# Patient Record
Sex: MALE | Race: WHITE | Employment: FULL TIME | URBAN - METROPOLITAN AREA
[De-identification: names, ages, dates, MRNs, and addresses within clinical notes are randomized per-mention and may not be internally consistent; named-entity substitution may affect disease eponyms.]

---

## 2017-06-22 RX ORDER — SODIUM CHLORIDE 9 MG/ML
100 INJECTION, SOLUTION INTRAVENOUS CONTINUOUS
Status: CANCELLED | OUTPATIENT
Start: 2017-06-22 | End: 2017-06-22

## 2017-06-22 RX ORDER — DEXTROMETHORPHAN/PSEUDOEPHED 2.5-7.5/.8
1.2 DROPS ORAL
Status: CANCELLED | OUTPATIENT
Start: 2017-06-22

## 2017-06-22 RX ORDER — EPINEPHRINE 0.1 MG/ML
1 INJECTION INTRACARDIAC; INTRAVENOUS
Status: CANCELLED | OUTPATIENT
Start: 2017-06-22 | End: 2017-06-22

## 2017-06-22 RX ORDER — ATROPINE SULFATE 0.1 MG/ML
0.5 INJECTION INTRAVENOUS
Status: CANCELLED | OUTPATIENT
Start: 2017-06-22 | End: 2017-06-22

## 2017-06-23 ENCOUNTER — HOSPITAL ENCOUNTER (OUTPATIENT)
Age: 62
Setting detail: OUTPATIENT SURGERY
Discharge: HOME | End: 2017-06-23
Attending: INTERNAL MEDICINE | Admitting: INTERNAL MEDICINE
Payer: COMMERCIAL

## 2017-06-23 VITALS
TEMPERATURE: 97.7 F | HEART RATE: 75 BPM | BODY MASS INDEX: 46.28 KG/M2 | SYSTOLIC BLOOD PRESSURE: 139 MMHG | RESPIRATION RATE: 16 BRPM | WEIGHT: 288 LBS | DIASTOLIC BLOOD PRESSURE: 76 MMHG | OXYGEN SATURATION: 96 % | HEIGHT: 66 IN

## 2017-06-23 LAB — GLUCOSE BLD STRIP.AUTO-MCNC: 96 MG/DL (ref 70–110)

## 2017-06-23 PROCEDURE — 76040000007: Performed by: INTERNAL MEDICINE

## 2017-06-23 PROCEDURE — 82962 GLUCOSE BLOOD TEST: CPT

## 2017-06-23 PROCEDURE — 88305 TISSUE EXAM BY PATHOLOGIST: CPT | Performed by: INTERNAL MEDICINE

## 2017-06-23 PROCEDURE — 77030020256 HC SOL INJ NACL 0.9%  500ML: Performed by: INTERNAL MEDICINE

## 2017-06-23 PROCEDURE — 99152 MOD SED SAME PHYS/QHP 5/>YRS: CPT

## 2017-06-23 PROCEDURE — 74011250636 HC RX REV CODE- 250/636

## 2017-06-23 PROCEDURE — 99153 MOD SED SAME PHYS/QHP EA: CPT

## 2017-06-23 PROCEDURE — 77030013991 HC SNR POLYP ENDOSC BSC -A: Performed by: INTERNAL MEDICINE

## 2017-06-23 RX ORDER — MIDAZOLAM HYDROCHLORIDE 1 MG/ML
.5-5 INJECTION, SOLUTION INTRAMUSCULAR; INTRAVENOUS
Status: DISCONTINUED | OUTPATIENT
Start: 2017-06-23 | End: 2017-06-23 | Stop reason: HOSPADM

## 2017-06-23 RX ORDER — NALOXONE HYDROCHLORIDE 0.4 MG/ML
0.4 INJECTION, SOLUTION INTRAMUSCULAR; INTRAVENOUS; SUBCUTANEOUS
Status: DISCONTINUED | OUTPATIENT
Start: 2017-06-23 | End: 2017-06-23 | Stop reason: HOSPADM

## 2017-06-23 RX ORDER — FLUMAZENIL 0.1 MG/ML
0.2 INJECTION INTRAVENOUS
Status: DISCONTINUED | OUTPATIENT
Start: 2017-06-23 | End: 2017-06-23 | Stop reason: HOSPADM

## 2017-06-23 RX ORDER — FENTANYL CITRATE 50 UG/ML
100 INJECTION, SOLUTION INTRAMUSCULAR; INTRAVENOUS
Status: DISCONTINUED | OUTPATIENT
Start: 2017-06-23 | End: 2017-06-23 | Stop reason: HOSPADM

## 2017-06-23 NOTE — IP AVS SNAPSHOT
[address] [address] 
[phone] Patient: Alisia Sharpe MRN: [id number] DEENA:[birth year] You are allergic to the following No active allergies Recent Documentation Height Weight BMI Smoking Status 1.676 m 130.6 kg 46.48 kg/m2 Former Smoker Emergency Contacts Name Discharge Info Relation Home Work Mobile Mitcheal Priyanka  Spouse [3] [phone] About your hospitalization You were admitted on:  June 23, 2017 You last received care in the:  [facility] ENDOSCOPY You were discharged on:  June 23, 2017 Unit phone number:  [phone] Why you were hospitalized Your primary diagnosis was:  Not on File Providers Seen During Your Hospitalizations Provider Role Specialty Primary office phone Doron Bui MD Attending Provider Gastroenterology [phone] Your Primary Care Physician (PCP) Primary Care Physician Office Phone Office Fax Lisette Hauser [phone] Follow-up Information None Current Discharge Medication List  
  
ASK your doctor about these medications Dose & Instructions Dispensing Information Comments Morning Noon Evening Bedtime ADVAIR DISKUS 250-50 mcg/dose diskus inhaler Generic drug:  fluticasone-salmeterol Your last dose was: Your next dose is:    
   
   
 Dose:  1 Puff Take 1 Puff by inhalation every twelve (12) hours. Indications: Non allergic asthma- adult onset Refills:  0  
     
   
   
   
  
 ascorbic acid (vitamin C) 500 mg tablet Commonly known as:  VITAMIN C Your last dose was: Your next dose is:    
   
   
 Dose:  500 mg Take 500 mg by mouth daily. Refills:  0  
     
   
   
   
  
 aspirin 81 mg chewable tablet Your last dose was:     
   
Your next dose is:    
   
   
 Dose:  81 mg  
 Take 81 mg by mouth nightly. Refills:  0 CENTRUM SILVER Tab tablet Generic drug:  multivitamins-minerals-lutein Your last dose was: Your next dose is:    
   
   
 Dose:  1 Tab Take 1 Tab by mouth daily. Refills:  0  
     
   
   
   
  
 CRESTOR 20 mg tablet Generic drug:  rosuvastatin Your last dose was: Your next dose is:    
   
   
 Dose:  30 mg Take 30 mg by mouth every seven (7) days. Indications: hypercholesterolemia Refills:  0  
     
   
   
   
  
 fenofibrate nanocrystallized 145 mg tablet Commonly known as:  [employer] Your last dose was: Your next dose is:    
   
   
 Dose:  145 mg Take 145 mg by mouth nightly. Indications: HYPERCHOLESTEROLEMIA Refills:  0  
     
   
   
   
  
 FISH OIL PO Your last dose was: Your next dose is: Take  by mouth nightly. Refills:  0  
     
   
   
   
  
 glipiZIDE 10 mg tablet Commonly known as:  Palomo Moy Your last dose was: Your next dose is:    
   
   
 Dose:  10 mg Take 10 mg by mouth two (2) times a day. Indications: TYPE 2 DIABETES MELLITUS Refills:  0 JARDIANCE 25 mg tablet Generic drug:  empagliflozin Your last dose was: Your next dose is:    
   
   
 Dose:  25 mg Take 25 mg by mouth daily. Refills:  0 LEVEMIR FLEXPEN 100 unit/mL (3 mL) Inpn Generic drug:  insulin detemir Your last dose was: Your next dose is:    
   
   
 Dose:  80 Units 80 Units by SubCUTAneous route nightly. Indications: TYPE 2 DIABETES MELLITUS Refills:  0 Liraglutide 0.6 mg/0.1 mL (18 mg/3 mL) sub-q pen Commonly known as:  Symone Jackson Your last dose was: Your next dose is:    
   
   
 Dose:  1.8 mg  
1.8 mg by SubCUTAneous route. Indications: TYPE 2 DIABETES MELLITUS Refills:  0 meloxicam 15 mg tablet Commonly known as:  MOBIC Your last dose was: Your next dose is:    
   
   
 Dose:  15 mg Take 15 mg by mouth nightly. Indications: OSTEOARTHRITIS Refills:  0  
     
   
   
   
  
 metFORMIN 1,000 mg tablet Commonly known as:  GLUCOPHAGE Your last dose was: Your next dose is:    
   
   
 Dose:  1000 mg Take 1,000 mg by mouth two (2) times daily (with meals). One and half in the morning and one at night. Indications: TYPE 2 DIABETES MELLITUS Refills:  0 POTASSIUM PO Your last dose was: Your next dose is: Take  by mouth two (2) times a day. Indications: leg cramps Refills:  0  
     
   
   
   
  
 ramipril 5 mg capsule Commonly known as:  ALTACE Your last dose was: Your next dose is:    
   
   
 Dose:  5 mg Take 5 mg by mouth nightly. Indications: HYPERTENSION, protect kidneys Refills:  0  
     
   
   
   
  
 SYNTHROID 175 mcg tablet Generic drug:  levothyroxine Your last dose was: Your next dose is:    
   
   
 Dose:  175 mcg Take 175 mcg by mouth Daily (before breakfast). Indications: HYPOTHYROIDISM Refills:  0 Discharge Instructions Hedy Parrish [id number] 
1955 COLON DISCHARGE INSTRUCTIONS Discomfort: 
Redness at IV site- apply warm compress to area; if redness or soreness persist- contact your physician There may be a slight amount of blood passed from the rectum Gaseous discomfort- walking, belching will help relieve any discomfort You may not operate a vehicle til the next day. You may not engage in an occupation involving machinery or appliances til the next day. You may not drink alcoholic beverages til the next day. DIET: 
 High fiber diet. ACTIVITY: 
You may not  resume your normal daily activities til the next day.  it is recommended that you spend the remainder of the day resting -  avoid any strenuous activity. CALL LUKE Eddy ANY SIGN OF: Increasing pain, nausea, vomiting Abdominal distension (swelling) New increased bleeding (oral or rectal) Fever (chills) Pain in chest area Bloody discharge from nose or mouth Shortness of breath You may not  take any Advil, Aspirin, Ibuprofen, Motrin, Aleve, or Goodys for 7 days, ONLY  Tylenol as needed for pain. Post procedure diagnosis:  POLYPS Follow-up Instructions: Your follow up colonoscopy will be in 5 years. We will notify you the results of your biopsy by letter within 2 weeks. Evangelist Hoffman MD 
June 23, 2017 DISCHARGE SUMMARY from Nurse The following personal items collected during your admission are returned to you:  
Dental Appliance: Dental Appliances: None Vision: Visual Aid: Glasses Hearing Aid:   
Jewelry:   
Clothing:   
Other Valuables:   
Valuables sent to safe:   
 
 
 
 
 
PATIENT INSTRUCTIONS: 
 
After general anesthesia or intravenous sedation, for 24 hours or while taking prescription Narcotics: · Limit your activities · Do not drive and operate hazardous machinery · Do not make important personal or business decisions · Do  not drink alcoholic beverages · If you have not urinated within 8 hours after discharge, please contact your surgeon on call. Report the following to your surgeon: 
· Excessive pain, swelling, redness or odor of or around the surgical area · Temperature over 100.5 · Nausea and vomiting lasting longer than 4 hours or if unable to take medications · Any signs of decreased circulation or nerve impairment to extremity: change in color, persistent  numbness, tingling, coldness or increase pain · Any questions No orders of the defined types were placed in this encounter.  
 
 
What to do at Home: 
Recommended activity: as above,  
 
 If you experience any of the following symptoms as above, please follow up with Dr. Noe Foley. *  Please give a list of your current medications to your Primary Care Provider. *  Please update this list whenever your medications are discontinued, doses are 
    changed, or new medications (including over-the-counter products) are added. *  Please carry medication information at all times in case of emergency situations. These are general instructions for a healthy lifestyle: No smoking/ No tobacco products/ Avoid exposure to second hand smoke Surgeon General's Warning:  Quitting smoking now greatly reduces serious risk to your health. Obesity, smoking, and sedentary lifestyle greatly increases your risk for illness A healthy diet, regular physical exercise & weight monitoring are important for maintaining a healthy lifestyle You may be retaining fluid if you have a history of heart failure or if you experience any of the following symptoms:  Weight gain of 3 pounds or more overnight or 5 pounds in a week, increased swelling in our hands or feet or shortness of breath while lying flat in bed. Please call your doctor as soon as you notice any of these symptoms; do not wait until your next office visit. Recognize signs and symptoms of STROKE: 
 
F-face looks uneven A-arms unable to move or move unevenly S-speech slurred or non-existent T-time-call 911 as soon as signs and symptoms begin-DO NOT go Back to bed or wait to see if you get better-TIME IS BRAIN. The discharge information has been reviewed with the patient and spouse. The patient and spouse verbalized understanding. Warning Signs of HEART ATTACK Call 911 if you have these symptoms: 
? Chest discomfort.  Most heart attacks involve discomfort in the center of the chest that lasts more than a few minutes, or that goes away and comes back. It can feel like uncomfortable pressure, squeezing, fullness, or pain. ? Discomfort in other areas of the upper body. Symptoms can include pain or discomfort in one or both arms, the back, neck, jaw, or stomach. ? Shortness of breath with or without chest discomfort. ? Other signs may include breaking out in a cold sweat, nausea, or lightheadedness. Don't wait more than five minutes to call [address]! Fast action can save your life. Calling 911 is almost always the fastest way to get lifesaving treatment. Emergency Medical Services staff can begin treatment when they arrive  up to an hour sooner than if someone gets to the hospital by car. The discharge information has been reviewed with the patient and caregiver. The patient and caregiver verbalized understanding. Discharge medications reviewed with the patient and guardian and appropriate educational materials and side effects teaching were provided. Patient armband removed and shredded Discharge Orders None Introducing [facility]! [facility] introduces CheckBonus patient portal. Now you can access parts of your medical record, email your doctor's office, and request medication refills online. 1. In your internet browser, go to [email or web address] 2. Click on the First Time User? Click Here link in the Sign In box. You will see the New Member Sign Up page. 3. Enter your CheckBonus Access Code exactly as it appears below. You will not need to use this code after youve completed the sign-up process. If you do not sign up before the expiration date, you must request a new code. · CheckBonus Access Code: [email or web address] Expires: 9/13/2017  8:50 AM 
 
4. Enter the last four digits of your Social Security Number (xxxx) and Date of Birth (mm/dd/yyyy) as indicated and click Submit. You will be taken to the next sign-up page. 5. Create a Clutter ID. This will be your Clutter login ID and cannot be changed, so think of one that is secure and easy to remember. 6. Create a Clutter password. You can change your password at any time. 7. Enter your Password Reset Question and Answer. This can be used at a later time if you forget your password. 8. Enter your e-mail address. You will receive e-mail notification when new information is available in [address] 9. Click Sign Up. You can now view and download portions of your medical record. 10. Click the Download Summary menu link to download a portable copy of your medical information. If you have questions, please visit the Frequently Asked Questions section of the Clutter website. Remember, Clutter is NOT to be used for urgent needs. For medical emergencies, dial 911. Now available from your iPhone and Android! General Information Please provide this summary of care documentation to your next provider. Patient Signature:  ____________________________________________________________ Date:  ____________________________________________________________  
  
Ganesh End Provider Signature:  ____________________________________________________________ Date:  ____________________________________________________________

## 2017-06-23 NOTE — PROCEDURES
[facility]  Colonoscopy Procedure Report  _______________________________________________________  Patient: Luis E Fox                                         Attending Physician: Becky Bush MD    Patient ID: [id number]                                      Referring Physician: René Fuchs MD    Exam Date: June 23, 2017 _______________________________________________________      Introduction: A  63 y.o. male patient, presents for outpatient Colonoscopy    Indications: history of a small transverse colon adenoma polyp removed with cold snare by Dr Noelle Powell 3/7/ 2014. He has Family history of polyps. He is asymptomatic. Consent: The benefits, risks, and alternatives to the procedure were discussed and informed consent was obtained from the patient. Preparation: EKG, pulse, pulse oximetry and blood pressure were monitored throughout the procedure. ASA Classification: Class 2 - . The heart is an S1-S2 and regular heart rate and rhythm. Lungs are clear to auscultation and percussion. Abdomen is obese protuberant, soft and nontender. Mental Status: awake, alert, and oriented to person, place, and time    Medications:  · Fentanyl 100 mcg IV before procedure. · Versed 5 mg IV throughout the procedure. Rectal Exam: Normal Rectal Exam. No Blood. Prostate not enlarged    Pathology Specimens: two specimens removed. Procedure: The colonoscope was passed with ease through the anus under direct visualization and advanced to the cecum. The patient required positioning on the back and external counter pressure to aid in the passage of the scope. The scope was withdrawn and the mucosa was carefully examined. The quality of the preparation was good. The views were good. The patient's toleration of the procedure was excellent. Total time is 23 minutes and withdrawal time is 15 minutes.     Findings:    Rectum:   Normal  Sigmoid:   Slightly tortuous and redundant sigmoid colon  Descending Colon:   Normal  Transverse Colon:   6 mm sessile polyp in the proximal tanrsnverse colon cold snared  Ascending Colon:   Small 5 mm sessile polyp at the hepatic flexure cold snared  Cecum:   Normal  Terminal Ileum:   Not entered      Unplanned Events: There were no unplanned events. Estimated Blood Loss: None  Impressions:    Slightly tortuous and redundant sigmoid colon. 6 mm sessile polyp in the proximal tanrsnverse colon cold snared. Small 5 mm sessile polyp at the hepatic flexure cold snared. Normal Mucosa. No diverticula found. Complications: None; patient tolerated the procedure well. Recommendations:  · Discharge home when standard parameters are met. · Resume a high fiber diet. · Colonoscopy recommendation in 5 years.     Procedure Codes:    · Judith Allan [[id number]]    Endoscope Information:  Model Number(s)    [id number]       Assistant: None      Signed By: Aaron Altamirano MD Date: June 23, 2017

## 2017-06-23 NOTE — DISCHARGE INSTRUCTIONS
Alisia Sharpe  [id number]  1955    COLON DISCHARGE INSTRUCTIONS    Discomfort:  Redness at IV site- apply warm compress to area; if redness or soreness persist- contact your physician  There may be a slight amount of blood passed from the rectum  Gaseous discomfort- walking, belching will help relieve any discomfort  You may not operate a vehicle til the next day. You may not engage in an occupation involving machinery or appliances til the next day. You may not drink alcoholic beverages til the next day. DIET:   High fiber diet. ACTIVITY:  You may not  resume your normal daily activities til the next day. it is recommended that you spend the remainder of the day resting -  avoid any strenuous activity. CALL M.D.  IF ANY SIGN OF:   Increasing pain, nausea, vomiting  Abdominal distension (swelling)  New increased bleeding (oral or rectal)  Fever (chills)  Pain in chest area  Bloody discharge from nose or mouth  Shortness of breath    You may not  take any Advil, Aspirin, Ibuprofen, Motrin, Aleve, or Goodys for 7 days, ONLY  Tylenol as needed for pain. Post procedure diagnosis:  POLYPS    Follow-up Instructions: Your follow up colonoscopy will be in 5 years. We will notify you the results of your biopsy by letter within 2 weeks.     Doron Bui MD  June 23, 2017       DISCHARGE SUMMARY from Nurse    The following personal items collected during your admission are returned to you:   Dental Appliance: Dental Appliances: None  Vision: Visual Aid: Glasses  Hearing Aid:    Jewelry:    Clothing:    Other Valuables:    Valuables sent to safe:              PATIENT INSTRUCTIONS:    After general anesthesia or intravenous sedation, for 24 hours or while taking prescription Narcotics:  · Limit your activities  · Do not drive and operate hazardous machinery  · Do not make important personal or business decisions  · Do  not drink alcoholic beverages  · If you have not urinated within 8 hours after discharge, please contact your surgeon on call. Report the following to your surgeon:  · Excessive pain, swelling, redness or odor of or around the surgical area  · Temperature over 100.5  · Nausea and vomiting lasting longer than 4 hours or if unable to take medications  · Any signs of decreased circulation or nerve impairment to extremity: change in color, persistent  numbness, tingling, coldness or increase pain  · Any questions      No orders of the defined types were placed in this encounter. What to do at Home:  Recommended activity: as above,     If you experience any of the following symptoms as above, please follow up with Dr. Minal Shell. *  Please give a list of your current medications to your Primary Care Provider. *  Please update this list whenever your medications are discontinued, doses are      changed, or new medications (including over-the-counter products) are added. *  Please carry medication information at all times in case of emergency situations. These are general instructions for a healthy lifestyle:    No smoking/ No tobacco products/ Avoid exposure to second hand smoke    Surgeon General's Warning:  Quitting smoking now greatly reduces serious risk to your health. Obesity, smoking, and sedentary lifestyle greatly increases your risk for illness    A healthy diet, regular physical exercise & weight monitoring are important for maintaining a healthy lifestyle    You may be retaining fluid if you have a history of heart failure or if you experience any of the following symptoms:  Weight gain of 3 pounds or more overnight or 5 pounds in a week, increased swelling in our hands or feet or shortness of breath while lying flat in bed. Please call your doctor as soon as you notice any of these symptoms; do not wait until your next office visit.     Recognize signs and symptoms of STROKE:    F-face looks uneven    A-arms unable to move or move unevenly    S-speech slurred or non-existent    T-time-call 911 as soon as signs and symptoms begin-DO NOT go       Back to bed or wait to see if you get better-TIME IS BRAIN. The discharge information has been reviewed with the patient and spouse. The patient and spouse verbalized understanding. Warning Signs of HEART ATTACK     Call 911 if you have these symptoms:   Chest discomfort. Most heart attacks involve discomfort in the center of the chest that lasts more than a few minutes, or that goes away and comes back. It can feel like uncomfortable pressure, squeezing, fullness, or pain.  Discomfort in other areas of the upper body. Symptoms can include pain or discomfort in one or both arms, the back, neck, jaw, or stomach.  Shortness of breath with or without chest discomfort.  Other signs may include breaking out in a cold sweat, nausea, or lightheadedness. Don't wait more than five minutes to call 911 - MINUTES MATTER! Fast action can save your life. Calling 911 is almost always the fastest way to get lifesaving treatment. Emergency Medical Services staff can begin treatment when they arrive -- up to an hour sooner than if someone gets to the hospital by car. The discharge information has been reviewed with the patient and caregiver. The patient and caregiver verbalized understanding. Discharge medications reviewed with the patient and guardian and appropriate educational materials and side effects teaching were provided.     Patient armband removed and shredded

## 2019-08-05 ENCOUNTER — HOSPITAL ENCOUNTER (OUTPATIENT)
Age: 64
Setting detail: OUTPATIENT SURGERY
Discharge: HOME | End: 2019-08-05
Attending: INTERNAL MEDICINE | Admitting: INTERNAL MEDICINE
Payer: COMMERCIAL

## 2019-08-05 VITALS
SYSTOLIC BLOOD PRESSURE: 117 MMHG | DIASTOLIC BLOOD PRESSURE: 54 MMHG | HEART RATE: 54 BPM | RESPIRATION RATE: 15 BRPM | BODY MASS INDEX: 43.58 KG/M2 | TEMPERATURE: 97.5 F | WEIGHT: 287.56 LBS | OXYGEN SATURATION: 97 % | HEIGHT: 68 IN

## 2019-08-05 DIAGNOSIS — Q20.8 LEFT VENTRICULAR CARDIAC ABNORMALITY: ICD-10-CM

## 2019-08-05 LAB
ANION GAP SERPL CALC-SCNC: 7 MMOL/L (ref 3–18)
BUN SERPL-MCNC: 22 MG/DL (ref 7–18)
BUN/CREAT SERPL: 23 (ref 12–20)
CALCIUM SERPL-MCNC: 8.7 MG/DL (ref 8.5–10.1)
CHLORIDE SERPL-SCNC: 111 MMOL/L (ref 100–111)
CHOLEST SERPL-MCNC: 143 MG/DL
CO2 SERPL-SCNC: 27 MMOL/L (ref 21–32)
CREAT SERPL-MCNC: 0.94 MG/DL (ref 0.6–1.3)
ERYTHROCYTE [DISTWIDTH] IN BLOOD BY AUTOMATED COUNT: 14.6 % (ref 11.6–14.5)
GLUCOSE SERPL-MCNC: 155 MG/DL (ref 74–99)
HCT VFR BLD AUTO: 48.6 % (ref 36–48)
HDLC SERPL-MCNC: 26 MG/DL (ref 40–60)
HDLC SERPL: 5.5 {RATIO} (ref 0–5)
HGB BLD-MCNC: 15.6 G/DL (ref 13–16)
INR PPP: 1.1 (ref 0.8–1.2)
LDLC SERPL CALC-MCNC: 84.4 MG/DL (ref 0–100)
LIPID PROFILE,FLP: ABNORMAL
MAGNESIUM SERPL-MCNC: 2.3 MG/DL (ref 1.6–2.6)
MCH RBC QN AUTO: 27.2 PG (ref 24–34)
MCHC RBC AUTO-ENTMCNC: 32.1 G/DL (ref 31–37)
MCV RBC AUTO: 84.7 FL (ref 74–97)
PLATELET # BLD AUTO: 178 K/UL (ref 135–420)
PMV BLD AUTO: 10.1 FL (ref 9.2–11.8)
POTASSIUM SERPL-SCNC: 4.1 MMOL/L (ref 3.5–5.5)
PROTHROMBIN TIME: 13.4 SEC (ref 11.5–15.2)
RBC # BLD AUTO: 5.74 M/UL (ref 4.7–5.5)
SODIUM SERPL-SCNC: 145 MMOL/L (ref 136–145)
TRIGL SERPL-MCNC: 163 MG/DL (ref ?–150)
VLDLC SERPL CALC-MCNC: 32.6 MG/DL
WBC # BLD AUTO: 4.7 K/UL (ref 4.6–13.2)

## 2019-08-05 PROCEDURE — 74011000250 HC RX REV CODE- 250: Performed by: INTERNAL MEDICINE

## 2019-08-05 PROCEDURE — 80048 BASIC METABOLIC PNL TOTAL CA: CPT

## 2019-08-05 PROCEDURE — 85610 PROTHROMBIN TIME: CPT

## 2019-08-05 PROCEDURE — 93005 ELECTROCARDIOGRAM TRACING: CPT

## 2019-08-05 PROCEDURE — 74011250637 HC RX REV CODE- 250/637: Performed by: INTERNAL MEDICINE

## 2019-08-05 PROCEDURE — 74011250636 HC RX REV CODE- 250/636

## 2019-08-05 PROCEDURE — C1894 INTRO/SHEATH, NON-LASER: HCPCS | Performed by: INTERNAL MEDICINE

## 2019-08-05 PROCEDURE — 80061 LIPID PANEL: CPT

## 2019-08-05 PROCEDURE — 77030004522 HC CATH ANGI DX EXPO BSC -A: Performed by: INTERNAL MEDICINE

## 2019-08-05 PROCEDURE — 74011636320 HC RX REV CODE- 636/320: Performed by: INTERNAL MEDICINE

## 2019-08-05 PROCEDURE — 85027 COMPLETE CBC AUTOMATED: CPT

## 2019-08-05 PROCEDURE — 77030008543 HC TBNG MON PRSS MRTM -A: Performed by: INTERNAL MEDICINE

## 2019-08-05 PROCEDURE — 93458 L HRT ARTERY/VENTRICLE ANGIO: CPT | Performed by: INTERNAL MEDICINE

## 2019-08-05 PROCEDURE — C1769 GUIDE WIRE: HCPCS | Performed by: INTERNAL MEDICINE

## 2019-08-05 PROCEDURE — 83735 ASSAY OF MAGNESIUM: CPT

## 2019-08-05 PROCEDURE — 77030013797 HC KT TRNSDUC PRSSR EDWD -A: Performed by: INTERNAL MEDICINE

## 2019-08-05 PROCEDURE — 99153 MOD SED SAME PHYS/QHP EA: CPT | Performed by: INTERNAL MEDICINE

## 2019-08-05 PROCEDURE — 77030029997 HC DEV COM RDL R BND TELE -B: Performed by: INTERNAL MEDICINE

## 2019-08-05 PROCEDURE — 74011250636 HC RX REV CODE- 250/636: Performed by: INTERNAL MEDICINE

## 2019-08-05 PROCEDURE — 77030004521 HC CATH ANGI DX COOK -B: Performed by: INTERNAL MEDICINE

## 2019-08-05 PROCEDURE — 99152 MOD SED SAME PHYS/QHP 5/>YRS: CPT | Performed by: INTERNAL MEDICINE

## 2019-08-05 RX ORDER — ACETAMINOPHEN 160 MG/5ML
200 SUSPENSION, ORAL (FINAL DOSE FORM) ORAL DAILY
COMMUNITY

## 2019-08-05 RX ORDER — VERAPAMIL HYDROCHLORIDE 2.5 MG/ML
INJECTION, SOLUTION INTRAVENOUS AS NEEDED
Status: DISCONTINUED | OUTPATIENT
Start: 2019-08-05 | End: 2019-08-05 | Stop reason: HOSPADM

## 2019-08-05 RX ORDER — SODIUM CHLORIDE 0.9 % (FLUSH) 0.9 %
5-40 SYRINGE (ML) INJECTION AS NEEDED
Status: CANCELLED | OUTPATIENT
Start: 2019-08-05

## 2019-08-05 RX ORDER — FENTANYL CITRATE 50 UG/ML
INJECTION, SOLUTION INTRAMUSCULAR; INTRAVENOUS AS NEEDED
Status: DISCONTINUED | OUTPATIENT
Start: 2019-08-05 | End: 2019-08-05 | Stop reason: HOSPADM

## 2019-08-05 RX ORDER — GUAIFENESIN 100 MG/5ML
81 LIQUID (ML) ORAL ONCE
Status: COMPLETED | OUTPATIENT
Start: 2019-08-05 | End: 2019-08-05

## 2019-08-05 RX ORDER — CALC/MAG/B COMPLEX/D3/HERB 61
15 TABLET ORAL
COMMUNITY

## 2019-08-05 RX ORDER — LIDOCAINE HYDROCHLORIDE 10 MG/ML
INJECTION INFILTRATION; PERINEURAL AS NEEDED
Status: DISCONTINUED | OUTPATIENT
Start: 2019-08-05 | End: 2019-08-05 | Stop reason: HOSPADM

## 2019-08-05 RX ORDER — HEPARIN SODIUM 200 [USP'U]/100ML
INJECTION, SOLUTION INTRAVENOUS
Status: COMPLETED | OUTPATIENT
Start: 2019-08-05 | End: 2019-08-05

## 2019-08-05 RX ORDER — SODIUM CHLORIDE 9 MG/ML
50 INJECTION, SOLUTION INTRAVENOUS CONTINUOUS
Status: DISCONTINUED | OUTPATIENT
Start: 2019-08-05 | End: 2019-08-05 | Stop reason: HOSPADM

## 2019-08-05 RX ORDER — MIDAZOLAM HYDROCHLORIDE 1 MG/ML
INJECTION, SOLUTION INTRAMUSCULAR; INTRAVENOUS AS NEEDED
Status: DISCONTINUED | OUTPATIENT
Start: 2019-08-05 | End: 2019-08-05 | Stop reason: HOSPADM

## 2019-08-05 RX ORDER — LORAZEPAM 1 MG/1
.5-1 TABLET ORAL ONCE
Status: COMPLETED | OUTPATIENT
Start: 2019-08-05 | End: 2019-08-05

## 2019-08-05 RX ORDER — SODIUM CHLORIDE 0.9 % (FLUSH) 0.9 %
5-40 SYRINGE (ML) INJECTION EVERY 8 HOURS
Status: CANCELLED | OUTPATIENT
Start: 2019-08-05

## 2019-08-05 RX ORDER — HEPARIN SODIUM 1000 [USP'U]/ML
INJECTION, SOLUTION INTRAVENOUS; SUBCUTANEOUS AS NEEDED
Status: DISCONTINUED | OUTPATIENT
Start: 2019-08-05 | End: 2019-08-05 | Stop reason: HOSPADM

## 2019-08-05 RX ADMIN — LORAZEPAM 1 MG: 1 TABLET ORAL at 09:04

## 2019-08-05 RX ADMIN — ASPIRIN 81 MG 81 MG: 81 TABLET ORAL at 09:04

## 2019-08-05 RX ADMIN — SODIUM CHLORIDE 50 ML/HR: 900 INJECTION, SOLUTION INTRAVENOUS at 09:04

## 2019-08-05 NOTE — Clinical Note
TRANSFER - IN REPORT:  
 
Verbal report received from: Delvis Treadwell. Report consisted of patient's Situation, Background, Assessment and  
Recommendations(SBAR). Opportunity for questions and clarification was provided. Assessment completed upon patient's arrival to unit and care assumed. Patient transported with a Registered Nurse and [address] [place] [address] / [facility] EXIM.

## 2019-08-05 NOTE — PROGRESS NOTES
Cardiology Progress Note        Patient: Sampson Tubbs        Sex: male          DOA: 8/5/2019  YOB: 1955      Age:  60 y.o.        LOS:  LOS: 0 days   Assessment/Plan   Cath done without compliaction. Discussed with patient and wife. Angiography pictures reviewed wife. % occluded and OM1 50%  Medical management. Complete report to follow.  THX    Signed By: Larissa German MD     August 5, 2019

## 2019-08-05 NOTE — DISCHARGE INSTRUCTIONS
_____________________________________________________________________________________________________________________  DISCHARGE SUMMARY from Nurse    PATIENT INSTRUCTIONS:    After general anesthesia or intravenous sedation, for 24 hours or while taking prescription Narcotics:  · Limit your activities  · Do not drive and operate hazardous machinery  · Do not make important personal or business decisions  · Do  not drink alcoholic beverages  · If you have not urinated within 8 hours after discharge, please contact your surgeon on call. Report the following to your surgeon:  · Excessive pain, swelling, redness or odor of or around the surgical area  · Temperature over 100.5  · Nausea and vomiting lasting longer than 4 hours or if unable to take medications  · Any signs of decreased circulation or nerve impairment to extremity: change in color, persistent  numbness, tingling, coldness or increase pain  · Any questions    What to do at Home:  Recommended activity: Activity as tolerated and no driving for today,       *  Please give a list of your current medications to your Primary Care Provider. *  Please update this list whenever your medications are discontinued, doses are      changed, or new medications (including over-the-counter products) are added. *  Please carry medication information at all times in case of emergency situations. These are general instructions for a healthy lifestyle:    No smoking/ No tobacco products/ Avoid exposure to second hand smoke  Surgeon General's Warning:  Quitting smoking now greatly reduces serious risk to your health.     Obesity, smoking, and sedentary lifestyle greatly increases your risk for illness    A healthy diet, regular physical exercise & weight monitoring are important for maintaining a healthy lifestyle    You may be retaining fluid if you have a history of heart failure or if you experience any of the following symptoms:  Weight gain of 3 pounds or more overnight or 5 pounds in a week, increased swelling in our hands or feet or shortness of breath while lying flat in bed. Please call your doctor as soon as you notice any of these symptoms; do not wait until your next office visit. The discharge information has been reviewed with the patient. The patient verbalized understanding. Discharge medications reviewed with the patient and appropriate educational materials and side effects teaching were provided. ___________________________________________________________________________________________________________________________________                 Cardiac Catheterization/Angiography Discharge Instructions    *Check the puncture site frequently for swelling or bleeding. If you see any bleeding, lie down and apply pressure over the area with a clean town or washcloth. Notify your doctor for any redness, swelling, drainage or oozing from the puncture site. Notify your doctor for any fever or chills. *If the leg or arm with the puncture becomes cold, numb or painful, go to the emergency room    *Activity should be limited for the next 48 hours. Climb stairs as little as possible and avoid any stooping, bending or strenuous activity for 48 hours. No heavy lifting (anything over 10 pounds) for three days. *Do not drive for 48 hours. *You may resume your usual diet. Drink more fluids than usual.    *Have a responsible person drive you home and stay with you for at least 24 hours after your heart catheterization/angiography. *You may remove the bandage from your Right and Arm in 24 hours. You may shower in 24 hours. No tub baths, hot tubs or swimming for one week. Do not place any lotions, creams, powders, ointments over the puncture site for one week. You may place a clean band-aid over the puncture site each day for 5 days. Change this daily.   Patient armband removed and shredded

## 2019-08-05 NOTE — PROGRESS NOTES
1150 Pt back to care unit S/P cardiac cath. Procedure tolerated well. Pt awake and alert. Tr band to rt wrist with immobilizer in place. Site WNL. Rt wrist precautions reinforced. Anderson Rashid

## 2019-08-05 NOTE — PROGRESS NOTES
Cardiology Progress Note        Patient: Franklyn Bamberger        Sex: male          DOA: 8/5/2019  YOB: 1955      Age:  60 y.o.        LOS:  LOS: 0 days   Assessment/Plan   Date of Surgery Update:  Franklyn Bamberger was seen and examined. History and physical has been reviewed. The patient has been examined.  There have been no significant clinical changes since the completion of the originally dated History and Physical.    Signed By: Kathie Crowder MD     August 5, 2019 10:29 AM             Signed By: Kathie Crowder MD     August 5, 2019

## 2019-08-05 NOTE — ROUTINE PROCESS
Cardiac Cath Lab:  Pre Procedure Chart Check Patients chart was accessed and reviewed for possible and/or scheduled procedure. Creatinine Clearance: 
Serum creatinine: 0.94 mg/dL 08/05/19 0815 Estimated creatinine clearance: 106.6 mL/min Total Contrast  Load: 
3 x estimated clearance amount=  319ml 75% of Contrast Load: 0.75 x Total Contrast Load=    239ml Recent Labs 08/05/19 
0815 WBC 4.7  
RBC 5.74* HCT 48.6* HGB 15.6  INR 1.1 PTP 13.4   
K 4.1 BUN 22* CREA 0.94 GFRAA >60  
GFRNA >60  
CA 8.7 BMI: Body mass index is 44.37 kg/m². ALLERGIES: No Known Allergies Lines: 
  
  
  
  
 
History: 
 
Past Medical History:  
Diagnosis Date  Arthritis   
 osteo  Asthma   
 adult onset  Cancer ([facility] [address] [facility]- temple  Diabetes ([facility] [address] 1992 Type II  
 Hypertension   
 in the past  
 Low HDL (under 40)  Morbid obesity ([facility] [address]  Thyroid disease   
 hypo  Unspecified sleep apnea   
 uses BiPAP Past Surgical History:  
Procedure Laterality Date  COLONOSCOPY N/A 6/23/2017 COLONOSCOPY, POLYPECTOMY performed by Lucretia Miller MD at [facility] HX GI  2009  
 colonoscopy  HX HEENT Left   
 laser eye  HX MOHS PROCEDURES Right  HX OTHER SURGICAL Left   
 varicose veins  HX SHOULDER ARTHROSCOPY Right  HX SHOULDER ARTHROSCOPY Right Patient Active Problem List  
Diagnosis Code  Diarrhea R19.7  Family history of colonic polyps Z83.71  
 Colon polyp K63.5

## 2019-08-05 NOTE — Clinical Note
Bilateral groin and right radial prepped with ChloraPrep and draped. Wet prep solution applied at: 1045. Wet prep solution dried at: 1050. Wet prep elapsed drying time: 5 mins.

## 2019-08-05 NOTE — Clinical Note
TRANSFER - OUT REPORT:  
 
Verbal report given to: RN. Report consisted of patient's Situation, Background, Assessment and  
Recommendations(SBAR). Opportunity for questions and clarification was provided. Patient transported with a Cardiac Cath Tech / Patient Care Tech. Patient transported to: CARE.

## 2019-08-05 NOTE — PROGRESS NOTES
Pt up to side of bed, dangled for few minutes. Up to bathroom to void without incident. Pt discharged via wheelchair to  Car in care of wife.   Pt denies any pain or distress, arm bands removed and shredded

## 2019-08-06 LAB
ATRIAL RATE: 60 BPM
CALCULATED P AXIS, ECG09: 4 DEGREES
CALCULATED R AXIS, ECG10: 28 DEGREES
CALCULATED T AXIS, ECG11: 35 DEGREES
CRD SYSTOLIC BP: 119
DIAGNOSIS, 93000: NORMAL
END DIASTOLIC PRESSURE: 11
P-R INTERVAL, ECG05: 166 MS
Q-T INTERVAL, ECG07: 438 MS
QRS DURATION, ECG06: 180 MS
QTC CALCULATION (BEZET), ECG08: 438 MS
VENTRICULAR RATE, ECG03: 60 BPM

## 2022-02-25 ENCOUNTER — HOSPITAL ENCOUNTER (OUTPATIENT)
Dept: WOUND CARE | Age: 67
Discharge: HOME | End: 2022-02-25
Payer: COMMERCIAL

## 2022-02-25 VITALS
RESPIRATION RATE: 18 BRPM | TEMPERATURE: 98.4 F | OXYGEN SATURATION: 100 % | SYSTOLIC BLOOD PRESSURE: 175 MMHG | DIASTOLIC BLOOD PRESSURE: 80 MMHG | HEART RATE: 71 BPM

## 2022-02-25 PROCEDURE — 11042 DBRDMT SUBQ TIS 1ST 20SQCM/<: CPT

## 2022-02-28 NOTE — WOUND CARE
02/25/22 1559   Wound Leg lower Left;Lateral   Date First Assessed/Time First Assessed: 02/25/22 1200   Present on Hospital Admission: Yes  Primary Wound Type: Traumatic  Location: Leg lower  Wound Location Orientation: Left;Lateral   Wound Image     Wound Etiology Traumatic   Dressing Status Clean;Dry; Intact   Cleansed Wound cleanser;Vashe   Dressing/Treatment Collagen;Alginate with Ag;Gauze dressing/dressing sponge;ABD pad;Roll gauze;Tape/Soft cloth adhesive tape  (2 layer wrap)   Dressing Change Due 03/04/22   Wound Length (cm) 1.4 cm   Wound Width (cm) 1.4 cm   Wound Depth (cm) 0.2 cm   Wound Surface Area (cm^2) 1.96 cm^2   Wound Volume (cm^3) 0.392 cm^3   Post-Procedure Length (cm) 1.6 cm   Post-Procedure Width (cm) 1.6 cm   Post-Procedure Depth (cm) 0.3 cm   Post-Procedure Surface Area (cm^2) 2.56 cm^2   Post-Procedure Volume (cm^3) 0.768 cm^3   Wound Assessment Pink/red;Granulation tissue   Drainage Amount Moderate   Drainage Description Serosanguinous   Wound Odor None   Britany-Wound/Incision Assessment Blanchable erythema;Edematous   Edges Defined edges; Attached edges   Wound Thickness Description Full thickness                 Multilayer Compression Wrap   (Not Unna) Below the Knee    NAME:  Prashant Chávez  YOB: 1955  MEDICAL RECORD NUMBER:  [id number]  DATE:  2/25/2022    Applied moisturizing agent to dry skin as needed. Applied primary and secondary dressing as ordered. Applied multilayered dressing below the knee to left lower leg. Instructed patient/caregiver not to remove dressing and to keep it clean and dry. Instructed patient/caregiver on complications to report to provider, such as pain, numbness in toes, heavy drainage, and slippage of dressing. Instructed patient on purpose of compression dressing and on activity and exercise recommendations.     Response to treatment: Well tolerated by patient       Electronically signed by Mahad Mena RN on 2/28/2022 at 4:13 PM

## 2022-03-04 ENCOUNTER — HOSPITAL ENCOUNTER (OUTPATIENT)
Dept: WOUND CARE | Age: 67
Discharge: HOME | End: 2022-03-04
Attending: PODIATRIST
Payer: COMMERCIAL

## 2022-03-04 VITALS
OXYGEN SATURATION: 100 % | TEMPERATURE: 97.9 F | SYSTOLIC BLOOD PRESSURE: 139 MMHG | DIASTOLIC BLOOD PRESSURE: 60 MMHG | RESPIRATION RATE: 18 BRPM | HEART RATE: 70 BPM

## 2022-03-04 PROCEDURE — 29581 APPL MULTLAYER CMPRN SYS LEG: CPT

## 2022-03-04 NOTE — WOUND CARE
03/04/22 0937   Wound Leg lower Left;Lateral   Date First Assessed/Time First Assessed: 02/25/22 1200   Present on Hospital Admission: Yes  Primary Wound Type: Traumatic  Location: Leg lower  Wound Location Orientation: Left;Lateral   Wound Image   (image not saved)   Wound Etiology Traumatic   Dressing Status Clean;Dry; Intact   Cleansed Wound cleanser   Dressing/Treatment Collagen;Alginate with Ag;Roll gauze;Gauze dressing/dressing sponge   Dressing Change Due 03/11/22   Wound Length (cm) 1.5 cm   Wound Width (cm) 1.5 cm   Wound Depth (cm) 0.1 cm   Wound Surface Area (cm^2) 2.25 cm^2   Change in Wound Size % -14.8   Wound Volume (cm^3) 0.225 cm^3   Wound Healing % 43   Wound Assessment Pink/red;Granulation tissue   Drainage Amount Small   Drainage Description Serosanguinous   Wound Odor None   Britany-Wound/Incision Assessment Intact   Edges Defined edges   Wound Thickness Description Partial thickness    applied collagen, mepitel one, alginate, and gauze secured with ruddy  Multilayer Compression Wrap   (Not Unna) Below the Knee    NAME:  Priscila Lutz OF BIRTH:  1955  MEDICAL RECORD NUMBER:  [id number]  DATE:  3/4/2022    Removed old Multilayer wrap if indicated and wash leg with mild soap/water. Applied moisturizing agent to dry skin as needed. Applied primary and secondary dressing as ordered. Applied multilayered dressing below the knee to left lower leg. Instructed patient/caregiver not to remove dressing and to keep it clean and dry. Instructed patient/caregiver on complications to report to provider, such as pain, numbness in toes, heavy drainage, and slippage of dressing. Instructed patient on purpose of compression dressing and on activity and exercise recommendations.     Response to treatment: Well tolerated by patient       Electronically signed by Melonie Singer RN on 3/4/2022 at 11:19 AM

## 2022-03-09 NOTE — WOUND CARE
[address]   Progress Note and Procedure Note     [address] RECORD NUMBER:  [id number]  AGE: 61 y.o. RACE WHITE/NON-  GENDER: male  : 1955  EPISODE DATE:  2022    Subjective:     Chief Complaint   Patient presents with    Wound Check         HISTORY of PRESENT ILLNESS HPI    [email or web address] [email or web address] Taylor is a 61 y.o. male who presents today for wound/ulcer evaluation. History of Wound Context: Patient is a Diabetic and [facility] with an open ulcer on his left leg. He states it started seven months ago, when he hit it on a box moving. Dr. Joellen Ledezma, Primary Care Physician, had been treating it and prescribed him an antibiotic, but it did not help. Patient was trying to treat it at home with Neosporin. This morning his FBS was unknown and HAIC=8.1,  Patient is allergic to LATEX. Wound/Ulcer Pain Timing/Severity: moderate  Quality of pain: tingling and throbbing  Severity:  1 / 10   Modifying Factors: None  Associated Signs/Symptoms: none    Ulcer Identification:  Ulcer Type: traumatic    Contributing Factors: diabetes    Wound:  Open ulcer on the left leg from hitting it on a box moving.          PAST MEDICAL HISTORY    Past Medical History:   Diagnosis Date    Arthritis     osteo    Asthma     adult onset    Cancer ([address]     [facility]    Diabetes ([address]     Type II    Hypertension     in the past    Low HDL (under 40)     Morbid obesity ([address]     Thyroid disease     hypo    Unspecified sleep apnea     uses BiPAP        PAST SURGICAL HISTORY    Past Surgical History:   Procedure Laterality Date    COLONOSCOPY N/A 2017    COLONOSCOPY, POLYPECTOMY performed by Matthew Gutierrez MD at [address] HX GI  2009    colonoscopy    HX HEENT Left     laser eye    HX MOHS PROCEDURES Right     HX OTHER SURGICAL Left     varicose veins    HX SHOULDER ARTHROSCOPY Right     HX SHOULDER ARTHROSCOPY Right        FAMILY HISTORY    History reviewed. No pertinent family history. SOCIAL HISTORY    Social History     Tobacco Use    Smoking status: Former Smoker     Quit date: 3/5/1990     Years since quittin.0    Smokeless tobacco: Never Used   Substance Use Topics    Alcohol use: Yes     Comment: rarely    Drug use: No       ALLERGIES    No Known Allergies    MEDICATIONS    Current Outpatient Medications on File Prior to Encounter   Medication Sig Dispense Refill    lansoprazole (PREVACID) 15 mg capsule Take 15 mg by mouth Daily (before breakfast).  coenzyme Q-10 (CO Q-10) 200 mg capsule Take 200 mg by mouth daily.  naltrexone-buPROPion (CONTRAVE) 8-90 mg TbER ER tablet Take  by mouth. First Month: Contrave 8mg/90mg #70  Week 1 : 1 Tab AM  Week 2 : 1 Tab AM, 1 Tab PM  Week 3 : 2 Tab AM, 1 Tab PM  Week 4 : 2 Tab AM, 2 Tab PM  Week 5 and Beyond: Contrave 8mg/90mg #120   2 Tab AM, 2 Tab PM      empagliflozin (JARDIANCE) 25 mg tablet Take 25 mg by mouth daily.  ramipril (ALTACE) 5 mg capsule Take 5 mg by mouth nightly. Indications: HYPERTENSION, protect kidneys      glipiZIDE (GLUCOTROL) 10 mg tablet Take 10 mg by mouth two (2) times a day. Indications: TYPE 2 DIABETES MELLITUS      rosuvastatin (CRESTOR) 20 mg tablet Take 30 mg by mouth every seven (7) days. Indications: hypercholesterolemia      Liraglutide (VICTOZA) 0.6 mg/0.1 mL (18 mg/3 mL) sub-q pen 1.8 mg by SubCUTAneous route. Indications: TYPE 2 DIABETES MELLITUS      meloxicam (MOBIC) 15 mg tablet Take 15 mg by mouth nightly. Indications: OSTEOARTHRITIS      insulin detemir (LEVEMIR FLEXPEN) 100 unit/mL (3 mL) pen 80 Units by SubCUTAneous route nightly. Indications: TYPE 2 DIABETES MELLITUS      metFORMIN (GLUCOPHAGE) 1,000 mg tablet Take 1,000 mg by mouth two (2) times daily (with meals). One and half in the morning and one at night.    Indications: TYPE 2 DIABETES MELLITUS      fenofibrate nanocrystallized (TRICOR) 145 mg tablet Take 145 mg by mouth nightly. Indications: HYPERCHOLESTEROLEMIA      levothyroxine (SYNTHROID) 175 mcg tablet Take 175 mcg by mouth Daily (before breakfast). Indications: HYPOTHYROIDISM      aspirin 81 mg chewable tablet Take 81 mg by mouth nightly.  POTASSIUM PO Take  by mouth two (2) times a day. Indications: leg cramps      DOCOSAHEXANOIC ACID/EPA (FISH OIL PO) Take  by mouth nightly.  ascorbic acid (VITAMIN C) 500 mg tablet Take 500 mg by mouth daily.  multivitamins-minerals-lutein (CENTRUM SILVER) tab tablet Take 1 Tab by mouth daily. No current facility-administered medications on file prior to encounter. REVIEW OF SYSTEMS    A comprehensive review of systems was negative except for that written in the HPI. Objective:     Visit Vitals  BP (!) 175/80   Pulse 71   Temp 98.4 °F (36.9 °C)   Resp 18   SpO2 100%       Wt Readings from Last 3 Encounters:   08/05/19 130.4 kg (287 lb 9 oz)   06/23/17 130.6 kg (288 lb)   03/07/14 139.7 kg (308 lb)       PHYSICAL EXAM    Pertinent items are noted in HPI. Vascular:  Dorsalis pedis pulses are 2/4 bilateral.  Posterior tibial pulses are 1/4 bilateral.  Capillary fill time is less than 3 seconds, bilateral. Positive moderate pitting  Edema is observed on the left lower extremities. Varicosities are observed bilateral lower extremities. Derm:  Skin temperature of lower extremities warm to cool proximal to distal bilateral.  Inspection of skin shows - open ulcer on the left leg measuring (1.4cm x 1.4cm x 0.2cm) with a yellow fibrotic base, deep to the subcutaneous layer, blanchable erythema, moderate serosanguineous drainage, negative signs of infection. Ortho:  Muscle strength 5/5 for all groups tested. Muscle tone normal. Inspection/palpation of bones - joints- muscle shows - within normal limits bilateral lower extremities.   Neuro:  Light touch, sharp/dull, vibratory, and proprioception sensations all decreased bilateral lower extremities. Deep tendon reflexes decreased bilaterally. Assessment:   1. Venous Insufficiency and Edema on the Left lower extremity. 2.  Open Venous Ulcer on the Left Lateral Leg.  3.  Type II IDDM. Problem List Items Addressed This Visit     None          POP IN PROCEDURE TYPE (DEBRIDEMENT, BIOPSY, I&D, SKIN SUB, CHEMICAL CAUERTY)     Debridement Wound Care        Problem List Items Addressed This Visit     None          Procedure Note  Indications:  Based on my examination of this patient's wound(s)/ulcer(s) today, debridement is required to promote healing and evaluate the wound base. Open venous ulcer measuring (1.4cm x 1.4cm x 0.2cm) with a yellow fibrotic base, deep to the subcutaneous layer, negative signs of infection, moderate serosanguineous drainage. Sharp debridement, apply Elizabeth, compression dressing. Performed by: Ganesh Perkins DPM    Consent obtained: Yes    Time out taken: Yes    Debridement: Excisional    Using curette the wound(s)/ulcer(s) was/were sharply debrided down through and including the removal of    subcutaneous tissue    Devitalized Tissue Debrided: slough    Pre Debridement Measurements:  Are located in the Wound/Ulcer Documentation Flow Sheet    Wound/Ulcer #: 1    Post Debridement Measurements:  Wound/Ulcer Descriptions are Pre Debridement except measurements:Diabetic ulcer, fat layer exposed    Wound Leg lower Left;Lateral (Active)   Wound Image    02/25/22 1559   Wound Etiology Traumatic 03/04/22 0937   Dressing Status Clean;Dry; Intact 03/04/22 0937   Cleansed Wound cleanser 03/04/22 0937   Dressing/Treatment Collagen;Alginate with Ag;Roll gauze;Gauze dressing/dressing sponge 03/04/22 0937   Dressing Change Due 03/11/22 03/04/22 0937   Wound Length (cm) 1.5 cm 03/04/22 0937   Wound Width (cm) 1.5 cm 03/04/22 0937   Wound Depth (cm) 0.1 cm 03/04/22 0937   Wound Surface Area (cm^2) 2.25 cm^2 03/04/22 0937   Change in Wound Size % -14.8 03/04/22 0937 Wound Volume (cm^3) 0.225 cm^3 03/04/22 0937   Wound Healing % 43 03/04/22 0937   Post-Procedure Length (cm) 1.6 cm 02/25/22 1559   Post-Procedure Width (cm) 1.6 cm 02/25/22 1559   Post-Procedure Depth (cm) 0.3 cm 02/25/22 1559   Post-Procedure Surface Area (cm^2) 2.56 cm^2 02/25/22 1559   Post-Procedure Volume (cm^3) 0.768 cm^3 02/25/22 1559   Wound Assessment Pink/red;Granulation tissue 03/04/22 0937   Drainage Amount Small 03/04/22 0937   Drainage Description Serosanguinous 03/04/22 0937   Wound Odor None 03/04/22 0937   Britany-Wound/Incision Assessment Intact 03/04/22 0937   Edges Defined edges 03/04/22 0937   Wound Thickness Description Partial thickness 03/04/22 0937   Number of days: 12        Percent of Wound(s)/Ulcer(s) Debrided: 100%    Total Surface Area Debrided:  1.96 sq cm     Diabetic/Pressure/Non Pressure Ulcers only:  Ulcer:     Estimated Blood Loss:  Minimal     Hemostasis Achieved: Pressure    Procedural Pain: 1 / 10     Post Procedural Pain: 2 / 10     Response to treatment: Well tolerated by patient       Plan:   1. New patient visit perform clinical evaluation for Venous Insufficiency and Edema. 2.  Perform Sharp debridement on the Left leg ulcer, applied Elizabeth, multilayered compression dressing. 3.  Patient is to return to the [address] [place] [address] for continued follow up treatment/care. Treatment Note please see attached Discharge Instructions    Written patient dismissal instructions given to patient or POA.          Electronically signed by Ganesh Perkins DPM on 3/9/2022 at 1:35 PM

## 2022-03-11 ENCOUNTER — HOSPITAL ENCOUNTER (OUTPATIENT)
Dept: WOUND CARE | Age: 67
Discharge: HOME | End: 2022-03-11
Attending: PODIATRIST
Payer: COMMERCIAL

## 2022-03-11 VITALS — TEMPERATURE: 98.4 F | HEART RATE: 72 BPM

## 2022-03-11 PROCEDURE — 87147 CULTURE TYPE IMMUNOLOGIC: CPT

## 2022-03-11 PROCEDURE — 87205 SMEAR GRAM STAIN: CPT

## 2022-03-11 PROCEDURE — 87186 SC STD MICRODIL/AGAR DIL: CPT

## 2022-03-11 PROCEDURE — 87077 CULTURE AEROBIC IDENTIFY: CPT

## 2022-03-11 PROCEDURE — 11042 DBRDMT SUBQ TIS 1ST 20SQCM/<: CPT

## 2022-03-11 NOTE — WOUND CARE
03/11/22 0954   Wound Leg lower Left;Lateral   Date First Assessed/Time First Assessed: 02/25/22 1200   Present on Hospital Admission: Yes  Primary Wound Type: Traumatic  Location: Leg lower  Wound Location Orientation: Left;Lateral   Wound Image    Wound Etiology Traumatic   Dressing Status Intact;Dry; Old drainage noted   Cleansed Wound cleanser   Dressing/Treatment Collagen with Ag;Alginate with Ag; Other (Comment); Roll gauze  (optisorb)   Dressing Change Due 03/18/22   Wound Length (cm) 2 cm   Wound Width (cm) 1.8 cm   Wound Depth (cm) 0.1 cm   Wound Surface Area (cm^2) 3.6 cm^2   Change in Wound Size % -83.67   Wound Volume (cm^3) 0.36 cm^3   Wound Healing % 8   Wound Assessment Granulation tissue   Drainage Amount Moderate   Drainage Description Serosanguinous   Wound Odor None   Britany-Wound/Incision Assessment Excoriated;Blanchable erythema; Warm   Edges Defined edges; Attached edges   Wound Thickness Description Full thickness      Applied omer, alginate with silver, optisorb secured with ruddy. Multilayer Compression Wrap   (Not Unna) Below the Knee    NAME:  Priscila Lutz OF BIRTH:  1955  MEDICAL RECORD NUMBER:  [id number]  DATE:  3/11/2022    Removed old Multilayer wrap if indicated and wash leg with mild soap/water. Applied moisturizing agent to dry skin as needed. Applied primary and secondary dressing as ordered. Applied multilayered dressing below the knee to left lower leg. Instructed patient/caregiver not to remove dressing and to keep it clean and dry. Instructed patient/caregiver on complications to report to provider, such as pain, numbness in toes, heavy drainage, and slippage of dressing. Instructed patient on purpose of compression dressing and on activity and exercise recommendations.     Response to treatment: Patient tolerated treatment with mild discomfort but relieved after procedure was completed      Electronically signed by Melonie Singer RN on 3/11/2022 at 9:59 AM

## 2022-03-14 LAB
BACTERIA SPEC CULT: ABNORMAL
BACTERIA SPEC CULT: ABNORMAL
GRAM STN SPEC: ABNORMAL
GRAM STN SPEC: ABNORMAL
SERVICE CMNT-IMP: ABNORMAL

## 2022-03-16 NOTE — WOUND CARE
Compression [address] f: [phone] f: [phone] p: [phone] [email or web address]     Ordering Center:     [facility]  [address] [place] [address] [phone]  WOUND CARE [unfilled]   FAX NUMBER [unfilled]    Patient Information:      [address] [facility]  [address] [facility] [address]   [unfilled]   : 1955  AGE: 61 y.o. GENDER: male   TODAYS DATE:  3/16/2022    Insurance:      PR[facility]UR[facility]  [id number]    Payor/Plan Subscr  Sex Relation Sub. Ins. ID Effective Group Num   1. BLUE CROSS Javi Mirian 1955 Male Self [id number]                                   [address] [address]       Patient Wound Information:      Problem List Items Addressed This Visit     None          WOUNDS REQUIRING DRESSING SUPPLIES:     Wound Leg lower Left;Lateral (Active)   Wound Image   22 0954   Wound Etiology Traumatic 22 09   Dressing Status Intact;Dry; Old drainage noted 22 0954   Cleansed Wound cleanser 22 09   Dressing/Treatment Collagen with Ag;Alginate with Ag; Other (Comment); Roll gauze 22 0954   Dressing Change Due 22 0954   Wound Length (cm) 2 cm 22 0954   Wound Width (cm) 1.8 cm 22 0954   Wound Depth (cm) 0.1 cm 22 0954   Wound Surface Area (cm^2) 3.6 cm^2 22 09   Change in Wound Size % -83.67 22 09   Wound Volume (cm^3) 0.36 cm^3 22 0954   Wound Healing % 8 22 0954   Post-Procedure Length (cm) 1.6 cm 22 1559   Post-Procedure Width (cm) 1.6 cm 22 1559   Post-Procedure Depth (cm) 0.3 cm 22   Post-Procedure Surface Area (cm^2) 2.56 cm^2 22 1559   Post-Procedure Volume (cm^3) 0.768 cm^3 22 1559   Wound Assessment Granulation tissue 22 0954   Drainage Amount Moderate 22 0954   Drainage Description Serosanguinous 03/11/22 0954   Wound Odor None 03/11/22 0954   Britany-Wound/Incision Assessment Excoriated;Blanchable erythema; Warm 03/11/22 0954   Edges Defined edges; Attached edges 03/11/22 0954   Wound Thickness Description Full thickness 03/11/22 0954   Number of days: 19        Right Leg Measurements: (ALL measurements are in cm)       Left Leg Measurements: (ALL measurements are in cm)  Floor to popliteal space - 42 cm  Ankle -   30.5 cm  Calf - 45 cm                                              Supplies Requested :     Medicare Requirements  Patient must have a qualifying Active Venus Ulcer if ordering Bilateral Compression Wounds MUST be present on both legs for Medicare Coverage. The patient can Not be on home health or have had a Medicare part A stay in the past 24 hours. Patient Wound(s) Debrided: [x] Yes if yes please add date 3/11/2022   [] No    Debribement Type: Excisional/Sharp    Patient currently being seen by Home Health: [] Yes   [x] No     Compression Type:  Other [address]    Provider Information:      PROVIDER'S NAME: Dr. Guido Stanton    NPI: [id number]

## 2022-03-18 ENCOUNTER — HOSPITAL ENCOUNTER (OUTPATIENT)
Dept: WOUND CARE | Age: 67
Discharge: HOME | End: 2022-03-18
Attending: PODIATRIST
Payer: COMMERCIAL

## 2022-03-18 VITALS
HEART RATE: 70 BPM | DIASTOLIC BLOOD PRESSURE: 62 MMHG | TEMPERATURE: 98.6 F | SYSTOLIC BLOOD PRESSURE: 144 MMHG | RESPIRATION RATE: 18 BRPM | OXYGEN SATURATION: 100 %

## 2022-03-18 PROCEDURE — 99214 OFFICE O/P EST MOD 30 MIN: CPT

## 2022-03-18 NOTE — WOUND CARE
03/18/22 1200   Wound Leg lower Left;Lateral;Inferior   Date First Assessed/Time First Assessed: 02/25/22 1200   Present on Hospital Admission: Yes  Primary Wound Type: Traumatic  Location: Leg lower  Wound Location Orientation: Left;Lateral;Inferior   Wound Image    Wound Etiology Traumatic   Dressing Status Clean;Dry; Intact   Cleansed Wound cleanser;Vashe   Dressing/Treatment Collagen;Alginate with Ag;Gauze dressing/dressing sponge;Roll gauze   Dressing Change Due 04/01/22   Wound Length (cm) 2.5 cm   Wound Width (cm) 2.4 cm   Wound Depth (cm) 0.1 cm   Wound Surface Area (cm^2) 6 cm^2   Change in Wound Size % -206.12   Wound Volume (cm^3) 0.6 cm^3   Wound Healing % -53   Wound Assessment Devitalized tissue;Pink/red   Drainage Amount Moderate   Drainage Description Serosanguinous   Wound Odor None   Britany-Wound/Incision Assessment Blanchable erythema   Edges Attached edges   Wound Thickness Description Full thickness   Wound Leg lower Left;Lateral;Superior   Date First Assessed/Time First Assessed: 03/18/22 1207   Present on Hospital Admission: Yes  Primary Wound Type: Traumatic  Location: Leg lower  Wound Location Orientation: Left;Lateral;Superior   Wound Etiology Traumatic   Dressing Status Clean;Dry; Intact   Cleansed Wound cleanser;Vashe   Dressing/Treatment Collagen;Alginate with Ag;Gauze dressing/dressing sponge;Roll gauze   Wound Length (cm) 1.8 cm   Wound Width (cm) 2 cm   Wound Depth (cm) 0.2 cm   Wound Surface Area (cm^2) 3.6 cm^2   Wound Volume (cm^3) 0.72 cm^3   Wound Assessment Devitalized tissue   Drainage Amount Small   Drainage Description Serosanguinous   Wound Odor None   Britany-Wound/Incision Assessment Blanchable erythema   Edges Attached edges   Wound Thickness Description Full thickness           Multilayer Compression Wrap   (Not Unna) Below the Knee    NAME:  Ed Jain  YOB: 1955  MEDICAL RECORD NUMBER:  [id number]  DATE:  3/18/2022    Removed old Multilayer wrap if indicated and wash leg with mild soap/water. Applied moisturizing agent to dry skin as needed. Applied primary and secondary dressing as ordered. Applied multilayered dressing below the knee to left lower leg. Instructed patient/caregiver not to remove dressing and to keep it clean and dry. Instructed patient/caregiver on complications to report to provider, such as pain, numbness in toes, heavy drainage, and slippage of dressing. Instructed patient on purpose of compression dressing and on activity and exercise recommendations.     Response to treatment: Well tolerated by patient       Electronically signed by Sylvester Schlatter, RN on 3/18/2022 at 12:13 PM

## 2022-03-24 ENCOUNTER — APPOINTMENT (OUTPATIENT)
Dept: WOUND CARE | Age: 67
End: 2022-03-24
Attending: PODIATRIST

## 2022-03-28 NOTE — WOUND CARE
[address]   Progress Note and Procedure Note   NO Procedure      [address] RECORD NUMBER:  [id number]  AGE: 61 y.o. RACE WHITE/NON-  GENDER: male  : 1955  EPISODE DATE:  3/11/2022    Subjective:     Chief Complaint   Patient presents with    Wound Check     left lower leg wound         HISTORY of PRESENT ILLNESS HPI    [email or web address] HERMINIO Carter is a 61 y.o. male who presents today for wound/ulcer evaluation. History of Wound Context: Patient is a IDDM male, returning to the [address] [place] [address] for follow up treatment on his chronic left leg ulcer. He states it started in 2021, when he hit it on a box while moving. Initially, he was seen and treated by his Primary Care Physician, the antibiotic prescribed did not heal the wound. The patient tried to treat it himself, but was unsuccessful. There are signs of infection in his left lateral leg. This morning his FBS was unknown and his HAIC=8.1. Wound/Ulcer Pain Timing/Severity: severe  Quality of pain: burning, stabbing and throbbing  Severity:  2 / 10   Modifying Factors: None  Associated Signs/Symptoms: pain    Ulcer Identification:  Ulcer Type: traumatic    Contributing Factors: diabetes    Wound: Chronic venous left lateral leg ulcer with cellulitis.         PAST MEDICAL HISTORY    Past Medical History:   Diagnosis Date    Arthritis     osteo    Asthma     adult onset    Cancer ([address]     BCC- temple    Diabetes ([facility]     Type II    Hypertension     in the past    Low HDL (under 40)     Morbid obesity ([facility]     Thyroid disease     hypo    Unspecified sleep apnea     uses BiPAP        PAST SURGICAL HISTORY    Past Surgical History:   Procedure Laterality Date    COLONOSCOPY N/A 2017    COLONOSCOPY, POLYPECTOMY performed by Eddy Simmons MD at [address] [facility]  2009    colonoscopy    HX HEENT Left     laser eye    HX MOHS PROCEDURES Right     HX OTHER SURGICAL Left     varicose veins    HX SHOULDER ARTHROSCOPY Right     HX SHOULDER ARTHROSCOPY Right        FAMILY HISTORY    History reviewed. No pertinent family history. SOCIAL HISTORY    Social History     Tobacco Use    Smoking status: Former Smoker     Quit date: 3/5/1990     Years since quittin.0    Smokeless tobacco: Never Used   Substance Use Topics    Alcohol use: Yes     Comment: rarely    Drug use: No       ALLERGIES    No Known Allergies    MEDICATIONS    Current Outpatient Medications on File Prior to Encounter   Medication Sig Dispense Refill    lansoprazole (PREVACID) 15 mg capsule Take 15 mg by mouth Daily (before breakfast).  coenzyme Q-10 (CO Q-10) 200 mg capsule Take 200 mg by mouth daily.  naltrexone-buPROPion (CONTRAVE) 8-90 mg TbER ER tablet Take  by mouth. First Month: Contrave 8mg/90mg #70  Week 1 : 1 Tab AM  Week 2 : 1 Tab AM, 1 Tab PM  Week 3 : 2 Tab AM, 1 Tab PM  Week 4 : 2 Tab AM, 2 Tab PM  Week 5 and Beyond: Contrave 8mg/90mg #120   2 Tab AM, 2 Tab PM      empagliflozin (JARDIANCE) 25 mg tablet Take 25 mg by mouth daily.  ramipril (ALTACE) 5 mg capsule Take 5 mg by mouth nightly. Indications: HYPERTENSION, protect kidneys      glipiZIDE (GLUCOTROL) 10 mg tablet Take 10 mg by mouth two (2) times a day. Indications: TYPE 2 DIABETES MELLITUS      rosuvastatin (CRESTOR) 20 mg tablet Take 30 mg by mouth every seven (7) days. Indications: hypercholesterolemia      Liraglutide (VICTOZA) 0.6 mg/0.1 mL (18 mg/3 mL) sub-q pen 1.8 mg by SubCUTAneous route. Indications: TYPE 2 DIABETES MELLITUS      meloxicam (MOBIC) 15 mg tablet Take 15 mg by mouth nightly. Indications: OSTEOARTHRITIS      insulin detemir (LEVEMIR FLEXPEN) 100 unit/mL (3 mL) pen 80 Units by SubCUTAneous route nightly. Indications: TYPE 2 DIABETES MELLITUS      metFORMIN (GLUCOPHAGE) 1,000 mg tablet Take 1,000 mg by mouth two (2) times daily (with meals).  One and half in the morning and one at night.   Indications: TYPE 2 DIABETES MELLITUS      fenofibrate nanocrystallized (TRICOR) 145 mg tablet Take 145 mg by mouth nightly. Indications: HYPERCHOLESTEROLEMIA      levothyroxine (SYNTHROID) 175 mcg tablet Take 175 mcg by mouth Daily (before breakfast). Indications: HYPOTHYROIDISM      aspirin 81 mg chewable tablet Take 81 mg by mouth nightly.  POTASSIUM PO Take  by mouth two (2) times a day. Indications: leg cramps      DOCOSAHEXANOIC ACID/EPA (FISH OIL PO) Take  by mouth nightly.  ascorbic acid (VITAMIN C) 500 mg tablet Take 500 mg by mouth daily.  multivitamins-minerals-lutein (CENTRUM SILVER) tab tablet Take 1 Tab by mouth daily. No current facility-administered medications on file prior to encounter. REVIEW OF SYSTEMS    A comprehensive review of systems was negative except for that written in the HPI. Objective:     Visit Vitals  Pulse 72   Temp 98.4 °F (36.9 °C)       Wt Readings from Last 3 Encounters:   08/05/19 130.4 kg (287 lb 9 oz)   06/23/17 130.6 kg (288 lb)   03/07/14 139.7 kg (308 lb)       PHYSICAL EXAM    Pertinent items are noted in HPI. Vascular:  Dorsalis pedis pulses are 2/4 bilateral.  Posterior tibial pulses are 2/4 bilateral.  Capillary fill time is less than 3 seconds, bilateral.  Edema is observed bilateral lower extremities. Varicosities are observed bilateral lower extremities. Derm:  Skin temperature of lower extremities warm to cool proximal to distal bilateral.  Inspection of skin shows positive digital hair with healthy skin bilateral.  Chronic left lateral leg ulcer, measuring (2.0cm x 1.8cm x 0.1cm), deep to the subcutaneous layer, with positive surrounding erythema, positive serosanguineous drainage, positive pain on palpation, positive signs of infection. Ortho:  Muscle strength 5/5 for all groups tested.   Muscle tone normal. Inspection/palpation of bones - joints- muscle shows - within normal limits on the bilateral lower extremities. Neuro:  Light touch, sharp/dull, vibratory, and proprioception sensations all decreased bilateral lower extremities. Deep tendon reflexes decreased bilaterally. Assessment:      1. Venous Ulcer on the left lateral leg. 2.  Cellulitis on the left leg. 3.  Type II IDDM. Problem List Items Addressed This Visit     None          Procedure Note  Indications:  Based on my examination of this patient's wound(s)/ulcer(s) today, debridement is not required to promote healing and evaluate the wound base. Wound Leg lower Left;Lateral;Inferior (Active)   Wound Image   03/18/22 1200   Wound Etiology Traumatic 03/18/22 1200   Dressing Status Clean;Dry; Intact 03/18/22 1200   Cleansed Wound cleanser;Vashe 03/18/22 1200   Dressing/Treatment Collagen;Alginate with Ag;Gauze dressing/dressing sponge;Roll gauze 03/18/22 1200   Dressing Change Due 04/01/22 03/18/22 1200   Wound Length (cm) 2.5 cm 03/18/22 1200   Wound Width (cm) 2.4 cm 03/18/22 1200   Wound Depth (cm) 0.1 cm 03/18/22 1200   Wound Surface Area (cm^2) 6 cm^2 03/18/22 1200   Change in Wound Size % -206.12 03/18/22 1200   Wound Volume (cm^3) 0.6 cm^3 03/18/22 1200   Wound Healing % -53 03/18/22 1200   Post-Procedure Length (cm) 1.6 cm 02/25/22 1559   Post-Procedure Width (cm) 1.6 cm 02/25/22 1559   Post-Procedure Depth (cm) 0.3 cm 02/25/22 1559   Post-Procedure Surface Area (cm^2) 2.56 cm^2 02/25/22 1559   Post-Procedure Volume (cm^3) 0.768 cm^3 02/25/22 1559   Wound Assessment Devitalized tissue;Pink/red 03/18/22 1200   Drainage Amount Moderate 03/18/22 1200   Drainage Description Serosanguinous 03/18/22 1200   Wound Odor None 03/18/22 1200   Britany-Wound/Incision Assessment Blanchable erythema 03/18/22 1200   Edges Attached edges 03/18/22 1200   Wound Thickness Description Full thickness 03/18/22 1200   Number of days: 31       Wound Leg lower Left;Lateral;Superior (Active)   Wound Etiology Traumatic 03/18/22 1200   Dressing Status Clean;Dry; Intact 03/18/22 1200   Cleansed Wound cleanser;Vashe 03/18/22 1200   Dressing/Treatment Collagen;Alginate with Ag;Gauze dressing/dressing sponge;Roll gauze 03/18/22 1200   Wound Length (cm) 1.8 cm 03/18/22 1200   Wound Width (cm) 2 cm 03/18/22 1200   Wound Depth (cm) 0.2 cm 03/18/22 1200   Wound Surface Area (cm^2) 3.6 cm^2 03/18/22 1200   Wound Volume (cm^3) 0.72 cm^3 03/18/22 1200   Wound Assessment Devitalized tissue 03/18/22 1200   Drainage Amount Small 03/18/22 1200   Drainage Description Serosanguinous 03/18/22 1200   Wound Odor None 03/18/22 1200   Britany-Wound/Incision Assessment Blanchable erythema 03/18/22 1200   Edges Attached edges 03/18/22 1200   Wound Thickness Description Full thickness 03/18/22 1200   Number of days: 10        Plan:     1. Perform local wound care on the left leg, cleaned with a wound cleanser, applied Maxorab II with Silver, with a Absorbant two layer Compressive Wrap. 2.  Take wound cultures - sent to lab for results. 3.  Dispense prescription for Augmentin to treat the infection. 4.  Patient to return to the [address] [place] [address] in two weeks for continued follow up care. Written patient dismissal instructions given to patient or POA.          Electronically signed by Jose Nagel DPM on 3/28/2022 at 1:43 PM

## 2022-04-01 ENCOUNTER — APPOINTMENT (OUTPATIENT)
Dept: WOUND CARE | Age: 67
End: 2022-04-01
Attending: PODIATRIST

## 2022-04-01 ENCOUNTER — HOSPITAL ENCOUNTER (OUTPATIENT)
Dept: WOUND CARE | Age: 67
Discharge: HOME | End: 2022-04-01
Attending: PODIATRIST
Payer: COMMERCIAL

## 2022-04-01 PROCEDURE — 11042 DBRDMT SUBQ TIS 1ST 20SQCM/<: CPT

## 2022-04-04 VITALS
DIASTOLIC BLOOD PRESSURE: 74 MMHG | SYSTOLIC BLOOD PRESSURE: 156 MMHG | TEMPERATURE: 98.5 F | OXYGEN SATURATION: 100 % | HEART RATE: 74 BPM | RESPIRATION RATE: 16 BRPM

## 2022-04-04 NOTE — WOUND CARE
04/01/22 0840   Wound Leg lower Left;Lateral;Inferior   Date First Assessed/Time First Assessed: 02/25/22 1200   Present on Hospital Admission: Yes  Primary Wound Type: Traumatic  Location: Leg lower  Wound Location Orientation: Left;Lateral;Inferior   Wound Image   (see above pictures)   Wound Etiology Traumatic   Dressing Status Clean;Dry; Intact   Cleansed Wound cleanser;Vashe   Dressing/Treatment Collagen;Alginate;Gauze dressing/dressing sponge;ABD pad;Roll gauze  (2 layer wrap)   Dressing Change Due 04/08/22   Wound Length (cm) 3.1 cm   Wound Width (cm) 2.5 cm   Wound Depth (cm) 0.2 cm   Wound Surface Area (cm^2) 7.75 cm^2   Change in Wound Size % -295.41   Wound Volume (cm^3) 1.55 cm^3   Wound Healing % -295   Post-Procedure Length (cm) 3.2 cm   Post-Procedure Width (cm) 2.6 cm   Post-Procedure Depth (cm) 0.2 cm   Post-Procedure Surface Area (cm^2) 8.32 cm^2   Post-Procedure Volume (cm^3) 1.664 cm^3   Wound Assessment Devitalized tissue   Drainage Amount Moderate   Drainage Description Serosanguinous   Wound Odor None   Britany-Wound/Incision Assessment Denuded; Maceration   Edges Attached edges   Wound Thickness Description Full thickness   Wound Leg lower Left;Lateral;Superior   Date First Assessed/Time First Assessed: 03/18/22 1207   Present on Hospital Admission: Yes  Primary Wound Type: Traumatic  Location: Leg lower  Wound Location Orientation: Left;Lateral;Superior   Wound Image     Wound Etiology Traumatic   Dressing Status Clean;Dry; Intact   Cleansed Wound cleanser;Vashe   Dressing/Treatment Collagen;Alginate;Gauze dressing/dressing sponge;ABD pad;Roll gauze  (2 layer wrap)   Dressing Change Due 04/08/22   Wound Length (cm) 2 cm   Wound Width (cm) 2 cm   Wound Depth (cm) 0.2 cm   Wound Surface Area (cm^2) 4 cm^2   Change in Wound Size % -11.11   Wound Volume (cm^3) 0.8 cm^3   Wound Healing % -11   Post-Procedure Length (cm) 2.1 cm   Post-Procedure Width (cm) 2.1 cm   Post-Procedure Depth (cm) 0.2 cm Post-Procedure Surface Area (cm^2) 4.41 cm^2   Post-Procedure Volume (cm^3) 0.882 cm^3   Wound Assessment Devitalized tissue   Drainage Amount Small   Drainage Description Serosanguinous   Wound Odor None   Britany-Wound/Incision Assessment Denuded;Blanchable erythema; Maceration   Edges Attached edges   Wound Thickness Description Full thickness                 Multilayer Compression Wrap   (Not Unna) Below the Knee    NAME:  Bharathi Alva OF BIRTH:  1955  MEDICAL RECORD NUMBER:  [id number]  DATE:  4/1/2022    Removed old Multilayer wrap if indicated and wash leg with mild soap/water. Applied moisturizing agent to dry skin as needed. Applied primary and secondary dressing as ordered. Applied multilayered dressing below the knee to left lower leg. Instructed patient/caregiver not to remove dressing and to keep it clean and dry. Instructed patient/caregiver on complications to report to provider, such as pain, numbness in toes, heavy drainage, and slippage of dressing. Instructed patient on purpose of compression dressing and on activity and exercise recommendations.     Response to treatment: Well tolerated by patient       Electronically signed by Ruth Hodge RN on 4/4/2022 at 8:45 AM

## 2022-04-08 ENCOUNTER — HOSPITAL ENCOUNTER (OUTPATIENT)
Dept: WOUND CARE | Age: 67
Discharge: HOME | End: 2022-04-08
Attending: PODIATRIST
Payer: COMMERCIAL

## 2022-04-08 VITALS
SYSTOLIC BLOOD PRESSURE: 149 MMHG | TEMPERATURE: 98.6 F | DIASTOLIC BLOOD PRESSURE: 59 MMHG | HEART RATE: 62 BPM | RESPIRATION RATE: 16 BRPM | OXYGEN SATURATION: 100 %

## 2022-04-08 PROBLEM — R60.0 EDEMA, LEG: Status: ACTIVE | Noted: 2022-04-08

## 2022-04-08 PROBLEM — I87.2 PERIPHERAL VENOUS INSUFFICIENCY: Status: ACTIVE | Noted: 2022-04-08

## 2022-04-08 PROBLEM — L97.922 ULCER OF LEFT LOWER LEG, WITH FAT LAYER EXPOSED (HCC): Status: ACTIVE | Noted: 2022-04-08

## 2022-04-08 PROCEDURE — 11042 DBRDMT SUBQ TIS 1ST 20SQCM/<: CPT

## 2022-04-08 RX ORDER — LIDOCAINE HYDROCHLORIDE 20 MG/ML
JELLY TOPICAL ONCE
Status: CANCELLED | OUTPATIENT
Start: 2022-04-08 | End: 2022-04-08

## 2022-04-08 RX ORDER — MUPIROCIN 20 MG/G
OINTMENT TOPICAL ONCE
Status: CANCELLED | OUTPATIENT
Start: 2022-04-08 | End: 2022-04-08

## 2022-04-08 RX ORDER — CLOBETASOL PROPIONATE 0.5 MG/G
OINTMENT TOPICAL ONCE
Status: CANCELLED | OUTPATIENT
Start: 2022-04-08 | End: 2022-04-08

## 2022-04-08 RX ORDER — BACITRACIN ZINC AND POLYMYXIN B SULFATE 500; 1000 [USP'U]/G; [USP'U]/G
OINTMENT TOPICAL ONCE
Status: CANCELLED | OUTPATIENT
Start: 2022-04-08 | End: 2022-04-08

## 2022-04-08 RX ORDER — SILVER SULFADIAZINE 10 G/1000G
CREAM TOPICAL ONCE
Status: CANCELLED | OUTPATIENT
Start: 2022-04-08 | End: 2022-04-08

## 2022-04-08 NOTE — WOUND CARE
04/08/22 0948   Wound Leg lower Left;Lateral;Superior   Date First Assessed/Time First Assessed: 03/18/22 1207   Present on Hospital Admission: Yes  Primary Wound Type: Traumatic  Location: Leg lower  Wound Location Orientation: Left;Lateral;Superior   Wound Image     Wound Etiology Traumatic   Dressing Status Intact; Old drainage noted   Cleansed Wound cleanser   Dressing/Treatment Collagen;Alginate with Ag;Roll gauze   Dressing Change Due 04/15/22   Wound Length (cm) 1 cm   Wound Width (cm) 1 cm   Wound Depth (cm) 0.1 cm   Wound Surface Area (cm^2) 1 cm^2   Change in Wound Size % 72.22   Wound Volume (cm^3) 0.1 cm^3   Wound Healing % 86   Wound Assessment Hyper granulation tissue   Drainage Amount Small   Drainage Description Serosanguinous   Wound Odor None   Britany-Wound/Incision Assessment Blanchable erythema;Fragile   Edges Flat/open edges   Wound Thickness Description Full thickness   Wound Leg lower Left;Lateral;Inferior   Date First Assessed/Time First Assessed: 02/25/22 1200   Present on Hospital Admission: Yes  Primary Wound Type: Traumatic  Location: Leg lower  Wound Location Orientation: Left;Lateral;Inferior   Wound Image     Wound Etiology Traumatic   Dressing Status Old drainage noted; Intact   Cleansed Wound cleanser   Dressing/Treatment Collagen;Alginate with Ag;Roll gauze   Dressing Change Due 04/15/22   Wound Length (cm) 5 cm   Wound Width (cm) 1.7 cm   Wound Depth (cm) 0.2 cm   Wound Surface Area (cm^2) 8.5 cm^2   Change in Wound Size % -333.67   Wound Volume (cm^3) 1.7 cm^3   Wound Healing % -334   Post-Procedure Length (cm) 5 cm   Post-Procedure Width (cm) 1.8 cm   Post-Procedure Depth (cm) 0.2 cm   Post-Procedure Surface Area (cm^2) 9 cm^2   Post-Procedure Volume (cm^3) 1.8 cm^3   Wound Assessment Granulation tissue;Fibrin   Drainage Amount Small   Drainage Description Serosanguinous   Wound Odor None   Britany-Wound/Incision Assessment Fragile;Blanchable erythema   Edges Attached edges   Wound Thickness Description Full thickness     Multilayer Compression Wrap   (Not Unna) Below the Knee    NAME:  Bharathi Alva OF BIRTH:  1955  MEDICAL RECORD NUMBER:  [id number]  DATE:  4/8/2022    Removed old Multilayer wrap if indicated and wash leg with mild soap/water. Applied moisturizing agent to dry skin as needed. Applied primary and secondary dressing as ordered. Applied multilayered dressing below the knee to left lower leg. Instructed patient/caregiver not to remove dressing and to keep it clean and dry. Instructed patient/caregiver on complications to report to provider, such as pain, numbness in toes, heavy drainage, and slippage of dressing. Instructed patient on purpose of compression dressing and on activity and exercise recommendations.     Response to treatment: Well tolerated by patient       Electronically signed by Trevon Lozano RN on 4/8/2022 at 9:53 AM

## 2022-04-11 PROBLEM — R60.0 EDEMA, LEG: Status: ACTIVE | Noted: 2022-04-08

## 2022-04-11 PROBLEM — L97.922 ULCER OF LEFT LOWER LEG, WITH FAT LAYER EXPOSED (HCC): Status: ACTIVE | Noted: 2022-04-08

## 2022-04-11 PROBLEM — I87.2 PERIPHERAL VENOUS INSUFFICIENCY: Status: ACTIVE | Noted: 2022-04-08

## 2022-04-13 NOTE — WOUND CARE
Debridement Wound Care        Problem List Items Addressed This Visit     None          Procedure Note  Indications:  Based on my examination of this patient's wound(s)/ulcer(s) today, debridement is required to promote healing and evaluate the wound base. Patient is a IDDM following up with [facility] for two venous ulcers on his left lateral leg, measuring (1.0 cm x 1.0 cm x 0.1 cm) and (5.0 cm x 1.7 cm x 0.2 cm),  deep to the subcutaneous level, positive serosanguineous drainage, positive erythema, negative signs of infection. Sharp debridement on the left leg ulcers, to remove the hypergranulation tissue and any fibrotic tissue, to the subcutaneous level to a healthy bleeding base. Post debridement measurement (1.2 cm x 1.2 cm x 0.2 cm) and (5.2 cm x 1.9 cm x 0.4 cm) round. Apply Endoform with Hydrofera Blue dressing. Performed by: Evita Jo DPM    Consent obtained: Yes    Time out taken: Yes    Debridement: Excisional    Using curette the wound(s)/ulcer(s) was/were sharply debrided down through and including the removal of    subcutaneous tissue    Devitalized Tissue Debrided: slough    Pre Debridement Measurements:  Are located in the Wound/Ulcer Documentation Flow Sheet    Wound/Ulcer #: 2    Post Debridement Measurements:  Wound/Ulcer Descriptions are Pre Debridement except measurements:Diabetic ulcer, fat layer exposed    Wound Leg lower Left;Lateral;Inferior (Active)   Wound Image    04/08/22 0948   Wound Etiology Traumatic 04/08/22 0948   Dressing Status Old drainage noted; Intact 04/08/22 0948   Cleansed Wound cleanser 04/08/22 0948   Dressing/Treatment Collagen;Alginate with Ag;Roll gauze 04/08/22 0948   Dressing Change Due 04/15/22 04/08/22 0948   Wound Length (cm) 5 cm 04/08/22 0948   Wound Width (cm) 1.7 cm 04/08/22 0948   Wound Depth (cm) 0.2 cm 04/08/22 0948   Wound Surface Area (cm^2) 8.5 cm^2 04/08/22 0948   Change in Wound Size % -333.67 04/08/22 0948   Wound Volume (cm^3) 1.7 cm^3 04/08/22 0948   Wound Healing % -334 04/08/22 0948   Post-Procedure Length (cm) 5 cm 04/08/22 0948   Post-Procedure Width (cm) 1.8 cm 04/08/22 0948   Post-Procedure Depth (cm) 0.2 cm 04/08/22 0948   Post-Procedure Surface Area (cm^2) 9 cm^2 04/08/22 0948   Post-Procedure Volume (cm^3) 1.8 cm^3 04/08/22 0948   Wound Assessment Granulation tissue;Fibrin 04/08/22 0948   Drainage Amount Small 04/08/22 0948   Drainage Description Serosanguinous 04/08/22 0948   Wound Odor None 04/08/22 0948   Britany-Wound/Incision Assessment Fragile;Blanchable erythema 04/08/22 0948   Edges Attached edges 04/08/22 0948   Wound Thickness Description Full thickness 04/08/22 0948   Number of days: 47       Wound Leg lower Left;Lateral;Superior (Active)   Wound Image    04/08/22 0948   Wound Etiology Traumatic 04/08/22 0948   Dressing Status Intact; Old drainage noted 04/08/22 0948   Cleansed Wound cleanser 04/08/22 0948   Dressing/Treatment Collagen;Alginate with Ag;Roll gauze 04/08/22 0948   Dressing Change Due 04/15/22 04/08/22 0948   Wound Length (cm) 1 cm 04/08/22 0948   Wound Width (cm) 1 cm 04/08/22 0948   Wound Depth (cm) 0.1 cm 04/08/22 0948   Wound Surface Area (cm^2) 1 cm^2 04/08/22 0948   Change in Wound Size % 72.22 04/08/22 0948   Wound Volume (cm^3) 0.1 cm^3 04/08/22 0948   Wound Healing % 86 04/08/22 0948   Post-Procedure Length (cm) 2.1 cm 04/01/22 0840   Post-Procedure Width (cm) 2.1 cm 04/01/22 0840   Post-Procedure Depth (cm) 0.2 cm 04/01/22 0840   Post-Procedure Surface Area (cm^2) 4.41 cm^2 04/01/22 0840   Post-Procedure Volume (cm^3) 0.882 cm^3 04/01/22 0840   Wound Assessment Hyper granulation tissue 04/08/22 0948   Drainage Amount Small 04/08/22 0948   Drainage Description Serosanguinous 04/08/22 0948   Wound Odor None 04/08/22 0948   Britany-Wound/Incision Assessment Blanchable erythema;Fragile 04/08/22 0948   Edges Flat/open edges 04/08/22 0948   Wound Thickness Description Full thickness 04/08/22 0948   Number of days: 26        Percent of Wound(s)/Ulcer(s) Debrided: 100%    Total Surface Area Debrided:  9.5 sq cm     Diabetic/Pressure/Non Pressure Ulcers only:  Ulcer:     Estimated Blood Loss:  Minimal     Hemostasis Achieved: Pressure    Procedural Pain: 2 / 10     Post Procedural Pain: 3 / 10     Response to treatment: Well tolerated by patient

## 2022-04-13 NOTE — WOUND CARE
[address]  [address]   Progress Note and Procedure Note     [address] RECORD NUMBER:  [id number]  AGE: 61 y.o. RACE WHITE/NON-  GENDER: male  : 1955  EPISODE DATE:  2022    Subjective:     Chief Complaint   Patient presents with    Wound Check         HISTORY of PRESENT ILLNESS HPI    [email or web address] [email or web address] Barba is a 61 y.o. male who presents today for wound/ulcer evaluation. History of Wound Context: Patient is a IDDM male, returning to the [address] [place] [address], for follow up treatment on his left leg Venous Insufficiency and Edema with an open ulcer. He is complaining of increased redness, pain, and drainage. This morning his FBS was 200+. Wound/Ulcer Pain Timing/Severity: moderate  Quality of pain: throbbing  Severity:  2 / 10   Modifying Factors: diabetes  Associated Signs/Symptoms: edema, erythema, increased drainage and pain    Ulcer Identification:  Ulcer Type: traumatic    Contributing Factors: edema, venous stasis and diabetes    Wound: Open ulcer on the left lateral leg with positive erythema, moderate serosanguineous drainage, and pain. PAST MEDICAL HISTORY    Past Medical History:   Diagnosis Date    Arthritis     osteo    Asthma     adult onset    Cancer ([address]     Psychiatric- temple    Diabetes ([facility]     Type II    Hypertension     in the past    Low HDL (under 40)     Morbid obesity ([address]     Thyroid disease     hypo    Unspecified sleep apnea     uses BiPAP        PAST SURGICAL HISTORY    Past Surgical History:   Procedure Laterality Date    COLONOSCOPY N/A 2017    COLONOSCOPY, POLYPECTOMY performed by Saeid Art MD at [address] [facility]  2009    colonoscopy    HX HEENT Left     laser eye    HX MOHS PROCEDURES Right     HX OTHER SURGICAL Left     varicose veins    HX SHOULDER ARTHROSCOPY Right     HX SHOULDER ARTHROSCOPY Right        FAMILY HISTORY    History reviewed.  No pertinent family history. SOCIAL HISTORY    Social History     Tobacco Use    Smoking status: Former Smoker     Quit date: 3/5/1990     Years since quittin.1    Smokeless tobacco: Never Used   Substance Use Topics    Alcohol use: Yes     Comment: rarely    Drug use: No       ALLERGIES    No Known Allergies    MEDICATIONS    Current Outpatient Medications on File Prior to Encounter   Medication Sig Dispense Refill    lansoprazole (PREVACID) 15 mg capsule Take 15 mg by mouth Daily (before breakfast).  coenzyme Q-10 (CO Q-10) 200 mg capsule Take 200 mg by mouth daily.  naltrexone-buPROPion (CONTRAVE) 8-90 mg TbER ER tablet Take  by mouth. First Month: Contrave 8mg/90mg #70  Week 1 : 1 Tab AM  Week 2 : 1 Tab AM, 1 Tab PM  Week 3 : 2 Tab AM, 1 Tab PM  Week 4 : 2 Tab AM, 2 Tab PM  Week 5 and Beyond: Contrave 8mg/90mg #120   2 Tab AM, 2 Tab PM      empagliflozin (JARDIANCE) 25 mg tablet Take 25 mg by mouth daily.  ramipril (ALTACE) 5 mg capsule Take 5 mg by mouth nightly. Indications: HYPERTENSION, protect kidneys      glipiZIDE (GLUCOTROL) 10 mg tablet Take 10 mg by mouth two (2) times a day. Indications: TYPE 2 DIABETES MELLITUS      rosuvastatin (CRESTOR) 20 mg tablet Take 30 mg by mouth every seven (7) days. Indications: hypercholesterolemia      Liraglutide (VICTOZA) 0.6 mg/0.1 mL (18 mg/3 mL) sub-q pen 1.8 mg by SubCUTAneous route. Indications: TYPE 2 DIABETES MELLITUS      meloxicam (MOBIC) 15 mg tablet Take 15 mg by mouth nightly. Indications: OSTEOARTHRITIS      insulin detemir (LEVEMIR FLEXPEN) 100 unit/mL (3 mL) pen 80 Units by SubCUTAneous route nightly. Indications: TYPE 2 DIABETES MELLITUS      metFORMIN (GLUCOPHAGE) 1,000 mg tablet Take 1,000 mg by mouth two (2) times daily (with meals). One and half in the morning and one at night. Indications: TYPE 2 DIABETES MELLITUS      fenofibrate nanocrystallized (TRICOR) 145 mg tablet Take 145 mg by mouth nightly.  Indications: HYPERCHOLESTEROLEMIA      levothyroxine (SYNTHROID) 175 mcg tablet Take 175 mcg by mouth Daily (before breakfast). Indications: HYPOTHYROIDISM      aspirin 81 mg chewable tablet Take 81 mg by mouth nightly.  POTASSIUM PO Take  by mouth two (2) times a day. Indications: leg cramps      DOCOSAHEXANOIC ACID/EPA (FISH OIL PO) Take  by mouth nightly.  ascorbic acid (VITAMIN C) 500 mg tablet Take 500 mg by mouth daily.  multivitamins-minerals-lutein (CENTRUM SILVER) tab tablet Take 1 Tab by mouth daily. No current facility-administered medications on file prior to encounter. REVIEW OF SYSTEMS    A comprehensive review of systems was negative except for that written in the HPI. Objective:     Visit Vitals  BP (!) 156/74 (BP 1 Location: Left upper arm, BP Patient Position: At rest;Sitting)   Pulse 74   Temp 98.5 °F (36.9 °C)   Resp 16   SpO2 100%       Wt Readings from Last 3 Encounters:   08/05/19 130.4 kg (287 lb 9 oz)   06/23/17 130.6 kg (288 lb)   03/07/14 139.7 kg (308 lb)       PHYSICAL EXAM    Pertinent items are noted in HPI. Vascular:  Dorsalis pedis pulses are 2/4 bilateral.  Posterior tibial pulses are 2/4 bilateral.  Capillary fill time is less than 3 seconds, bilateral.  Edema is observed bilateral lower extremities. Varicosities are observed bilateral lower extremities. Derm:  Skin temperature of lower extremities warm to cool proximal to distal bilateral.  Inspection of skin shows - open ulcer on the left lateral leg measuring (3.1 cm x 2.5 cm x 0.2 cm) with positive erythema, positive serosanguineous drainage, positive pain on palpation. Ortho:  Muscle strength 5/5 for all groups tested. Muscle tone normal. Inspection/palpation of bones - joints- muscle shows - within normal limits on the bilateral lower extremities. Neuro:  Light touch, sharp/dull, vibratory, and proprioception sensations all decreased bilateral lower extremities.   Deep tendon reflexes decreased bilaterally. Assessment:     1. Type II IDDM with Venous Insufficiency and Edema on the left lower extremity. 2.  Venous Ulcer on the left leg with Cellulitis. Problem List Items Addressed This Visit     None          POP IN PROCEDURE TYPE (DEBRIDEMENT, BIOPSY, I&D, SKIN SUB, CHEMICAL CAUERTY)     Debridement Wound Care        Problem List Items Addressed This Visit     None          Procedure Note  Indications:  Based on my examination of this patient's wound(s)/ulcer(s) today, debridement is required to promote healing and evaluate the wound base. Shape debridement with a number 15 blade on the left leg ulcer to remove all the white fibrotic tissue, deep through the subcutaneous level to a healthy bleeding base. Post debridement measurement (3.3 cm x 2.7 cm x 0.4 cm) round. Apply Purcacol, Alginate 4x4 Opticell dressing. Performed by: Deidra Reddy DPM    Consent obtained: Yes    Time out taken: Yes    Debridement: Excisional    Using curette the wound(s)/ulcer(s) was/were sharply debrided down through and including the removal of    subcutaneous tissue    Devitalized Tissue Debrided: slough    Pre Debridement Measurements:  Are located in the Wound/Ulcer Documentation Flow Sheet    Wound/Ulcer #: 1    Post Debridement Measurements:  Wound/Ulcer Descriptions are Pre Debridement except measurements:Diabetic ulcer, fat layer exposed    Wound Leg lower Left;Lateral;Inferior (Active)   Wound Image    04/08/22 0948   Wound Etiology Traumatic 04/08/22 0948   Dressing Status Old drainage noted; Intact 04/08/22 0948   Cleansed Wound cleanser 04/08/22 0948   Dressing/Treatment Collagen;Alginate with Ag;Roll gauze 04/08/22 0948   Dressing Change Due 04/15/22 04/08/22 0948   Wound Length (cm) 5 cm 04/08/22 0948   Wound Width (cm) 1.7 cm 04/08/22 0948   Wound Depth (cm) 0.2 cm 04/08/22 0948   Wound Surface Area (cm^2) 8.5 cm^2 04/08/22 0948   Change in Wound Size % -333.67 04/08/22 0948   Wound Volume (cm^3) 1.7 cm^3 04/08/22 0948   Wound Healing % -334 04/08/22 0948   Post-Procedure Length (cm) 5 cm 04/08/22 0948   Post-Procedure Width (cm) 1.8 cm 04/08/22 0948   Post-Procedure Depth (cm) 0.2 cm 04/08/22 0948   Post-Procedure Surface Area (cm^2) 9 cm^2 04/08/22 0948   Post-Procedure Volume (cm^3) 1.8 cm^3 04/08/22 0948   Wound Assessment Granulation tissue;Fibrin 04/08/22 0948   Drainage Amount Small 04/08/22 0948   Drainage Description Serosanguinous 04/08/22 0948   Wound Odor None 04/08/22 0948   Britany-Wound/Incision Assessment Fragile;Blanchable erythema 04/08/22 0948   Edges Attached edges 04/08/22 0948   Wound Thickness Description Full thickness 04/08/22 0948   Number of days: 47       Wound Leg lower Left;Lateral;Superior (Active)   Wound Image    04/08/22 0948   Wound Etiology Traumatic 04/08/22 0948   Dressing Status Intact; Old drainage noted 04/08/22 0948   Cleansed Wound cleanser 04/08/22 0948   Dressing/Treatment Collagen;Alginate with Ag;Roll gauze 04/08/22 0948   Dressing Change Due 04/15/22 04/08/22 0948   Wound Length (cm) 1 cm 04/08/22 0948   Wound Width (cm) 1 cm 04/08/22 0948   Wound Depth (cm) 0.1 cm 04/08/22 0948   Wound Surface Area (cm^2) 1 cm^2 04/08/22 0948   Change in Wound Size % 72.22 04/08/22 0948   Wound Volume (cm^3) 0.1 cm^3 04/08/22 0948   Wound Healing % 86 04/08/22 0948   Post-Procedure Length (cm) 2.1 cm 04/01/22 0840   Post-Procedure Width (cm) 2.1 cm 04/01/22 0840   Post-Procedure Depth (cm) 0.2 cm 04/01/22 0840   Post-Procedure Surface Area (cm^2) 4.41 cm^2 04/01/22 0840   Post-Procedure Volume (cm^3) 0.882 cm^3 04/01/22 0840   Wound Assessment Hyper granulation tissue 04/08/22 0948   Drainage Amount Small 04/08/22 0948   Drainage Description Serosanguinous 04/08/22 0948   Wound Odor None 04/08/22 0948   Britany-Wound/Incision Assessment Blanchable erythema;Fragile 04/08/22 0948   Edges Flat/open edges 04/08/22 0948   Wound Thickness Description Full thickness 04/08/22 0948   Number of days: 26        Percent of Wound(s)/Ulcer(s) Debrided: 100%    Total Surface Area Debrided: 7.75  sq cm     Diabetic/Pressure/Non Pressure Ulcers only:  Ulcer:     Estimated Blood Loss:  Minimal     Hemostasis Achieved: Pressure    Procedural Pain: 1 / 10     Post Procedural Pain: 2 / 10     Response to treatment: Well tolerated by patient     Plan:     1. Local wound care on the left leg for treatment of Venous Insufficiency, Edema and Cellulitis. 2.  Dispense prescription for Augmentin to treat the Cellulitis - Staph. A.  3.  Sharp debridement on the left leg ulcer, applied Puracol, Alginate 4x4 Opticell Dressing. 4.  Patient is to return to the [address] [place] [address] for continued follow up treatment. Treatment Note please see attached Discharge Instructions    Written patient dismissal instructions given to patient or POA.          Electronically signed by Jessica Vaughn DPM on 4/13/2022 at 2:19 PM

## 2022-04-15 ENCOUNTER — APPOINTMENT (OUTPATIENT)
Dept: WOUND CARE | Age: 67
End: 2022-04-15
Attending: PODIATRIST
Payer: COMMERCIAL

## 2022-04-15 ENCOUNTER — HOSPITAL ENCOUNTER (OUTPATIENT)
Dept: WOUND CARE | Age: 67
Discharge: HOME | End: 2022-04-15
Attending: PODIATRIST
Payer: COMMERCIAL

## 2022-04-15 VITALS
DIASTOLIC BLOOD PRESSURE: 59 MMHG | TEMPERATURE: 97.9 F | RESPIRATION RATE: 16 BRPM | OXYGEN SATURATION: 100 % | SYSTOLIC BLOOD PRESSURE: 147 MMHG | HEART RATE: 74 BPM

## 2022-04-15 DIAGNOSIS — I87.2 PERIPHERAL VENOUS INSUFFICIENCY: Primary | ICD-10-CM

## 2022-04-15 DIAGNOSIS — R60.0 EDEMA, LEG: ICD-10-CM

## 2022-04-15 DIAGNOSIS — L97.922 ULCER OF LEFT LOWER LEG, WITH FAT LAYER EXPOSED (HCC): ICD-10-CM

## 2022-04-15 PROCEDURE — 29580 STRAPPING UNNA BOOT: CPT

## 2022-04-15 RX ORDER — MUPIROCIN 20 MG/G
OINTMENT TOPICAL ONCE
Status: CANCELLED | OUTPATIENT
Start: 2022-04-15 | End: 2022-04-15

## 2022-04-15 RX ORDER — CLOBETASOL PROPIONATE 0.5 MG/G
OINTMENT TOPICAL ONCE
Status: CANCELLED | OUTPATIENT
Start: 2022-04-15 | End: 2022-04-15

## 2022-04-15 RX ORDER — LIDOCAINE HYDROCHLORIDE 20 MG/ML
JELLY TOPICAL ONCE
Status: CANCELLED | OUTPATIENT
Start: 2022-04-15 | End: 2022-04-15

## 2022-04-15 RX ORDER — SILVER SULFADIAZINE 10 G/1000G
CREAM TOPICAL ONCE
Status: CANCELLED | OUTPATIENT
Start: 2022-04-15 | End: 2022-04-15

## 2022-04-15 RX ORDER — BACITRACIN ZINC AND POLYMYXIN B SULFATE 500; 1000 [USP'U]/G; [USP'U]/G
OINTMENT TOPICAL ONCE
Status: CANCELLED | OUTPATIENT
Start: 2022-04-15 | End: 2022-04-15

## 2022-04-15 NOTE — DISCHARGE INSTRUCTIONS
Discharge Instructions for  [address]  [address], [facility] [facility] [address]  [phone] Fax [phone]    NAME:  Carmita Jackson OF BIRTH:  1955  MEDICAL RECORD NUMBER:  [id number]  DATE:  4/15/2022    Wound Cleansing:   Do not scrub or use excessive force. Cleanse wound prior to applying a clean dressing with:  [] Normal Saline [] Keep Wound Dry in Shower    [x] Wound Cleanser   [] Cleanse wound with Mild Soap & Water  [] May Shower at Discharge   [] Other:      Topical Treatments:  Do not apply lotions, creams, or ointments to wound bed unless directed. [x] Apply moisturizing lotion to skin surrounding the wound prior to dressing change.  [] Apply antifungal ointment to skin surrounding the wound prior to dressing change. [x] Apply thin film of moisture barrier ointment to skin immediately around wound. [] Other:       Dressings:           Wound Location ***   [x] Apply Primary Dressing:       [] MediHoney Gel [] Alginate with Silver [] Alginate   [x] Collagen [] Collagen with Silver   [] Santyl with Moisten saline gauze     [] Hydrocolloid   [] MediHoney Alginate [] Foam with Silver   [] Foam   [] Hydrofera Blue    [] Mepilex Border    [] Moisten with Saline [] Hydrogel [x] Mepitel     [] Bactroban/Mupirocin [] Polysporin  [] Other:    [] Pack wound loosely with  [] Iodoform   [] Plain Packing  [] Other   [x] Cover and Secure with:     [x] Gauze [x] Jerrod [] Kerlix   [] Ace Wrap [] Cover Roll Tape [] ABD     [] Other:    Avoid contact of tape with skin.   [] Change dressing: [] Daily    [] Every Other Day [] Three times per week   [] Once a week [] Do Not Change Dressing   [] Other:     Negative Pressure:           Wound Location:   [] Pressure@           mm/Hg  []Continuous []Intermittent   [] Black  [] White Foam [] Other:   []Change dressing: []Three times per week    []Other:     Pressure Relief:  [] When sitting, shift position or do seat lifts every 15 minutes.  [] Wheelchair cushion [] Specialty Bed/Mattress  [] Turn every 2 hours when in bed. Avoid position directing pressure on wound site. Limit side lying to 30 degree tilt. Limit HOB elevation to 30 degrees. Edema Control:  Apply: [] Compression Stocking []Right Leg []Left Leg   [] Tubigrip []Right Leg Double Layer []Left Leg Double Layer       []Right Leg Single Layer []Left Leg Single Layer   [] SpandaGrip []Right Leg  []Left Leg      []Low compression 5-10 mm/Hg      []Medium compression 10-20 mm/Hg     []High compression  20-30 mm/Hg   every morning immediately when getting up should be applied to affected leg(s) from mid foot to knee making sure to cover the heel. Remove every night before going to bed. [] Elevate leg(s) above the level of the heart when sitting. [] Avoid prolonged standing in one place. [] Elevate arm/hand above the level of the heart []RightArm []LeftArm     Compression:  Apply: [] Multilayer Compression Wrap Applied in Clinic []RightLeg []Left Leg   [x] Multi-layer compression. Do not get leg(s) with wrap wet. If wraps become too tight call the center or completely remove the wrap. [x] Elevate leg(s) above the level of the heart when sitting. [x] Avoid prolonged standing in one place. Off-Loading:   [x] Off-loading when [x] walking  [x] in bed [x] sitting  [] Total non-weight bearing  [] Right Leg  [] Left Leg   [] Assistive Device [] Walker [] Cane  [] Wheelchair  [] Crutches   [] Surgical shoe    [] Podus Boot(s)   [] Foam Boot(s)  [] Roll About    [] Cast Boot [] CROW Boot  [] Other:    Contact Cast:  Apply: [] Total Contact Cast Applied in Clinic []RightLeg []Left Leg   [] Do not get cast wet. Contact center or go to emergency room if there is a foul odor or becomes uncomfortable due to feeling tight or swelling. Do not use objects inside of cast to scratch.       Dietary:  [x] Diet as tolerated: [] Calorie Diabetic Diet: [] No Added Salt:  [] Increase Protein: [] Other:   Activity:  [x] Activity as tolerated:  [] Patient has no activity restrictions     [] Strict Bedrest: [] Remain off Work:     [] May return to full duty work:                                   [] Return to work with restrictions:             Return Appointment:  [] Wound and dressing supply provider:   [] ECF or Home Healthcare:  [x] Wound Assessment: [x] Physician or NP scheduled for Wound Assessment:   [x] Return Appointment: 1 Week(s)  [] Ordered tests:      Electronically signed Juve Stewart LPN on 7/42/6680 at 09:18 AM     [address]: Should you experience any significant changes in your wound(s) or have questions about your wound care, please contact the [facility] at [address] [place] [address] 8:00 am - 4:30. If you need help with your wound outside these hours and cannot wait until we are again available, contact your PCP or go to the hospital emergency room. PLEASE NOTE: IF YOU ARE UNABLE TO OBTAIN WOUND SUPPLIES, CONTINUE TO USE THE SUPPLIES YOU HAVE AVAILABLE UNTIL YOU ARE ABLE TO REACH US. IT IS MOST IMPORTANT TO KEEP THE WOUND COVERED AT ALL TIMES.      Physician Signature:_______________________    Date: ___________ Time:  ____________

## 2022-04-19 NOTE — WOUND CARE
[employer] Application   Below Knee    NAME:  Germain Blizzard OF BIRTH:  1955  MEDICAL RECORD NUMBER:  [id number]  DATE:  4/15/2022    Remove old Unna Boot or Boots. Applied moisturizing agent to dry skin as needed. Appied primary and secondary dressing as ordered. Applied Unna roll from toes to knee overlapping each time. Applied ace wrap or coban from toes to below the knee. Instructed patient/caregiver to keep dressing dry and intact. DO NOT REMOVE DRESSING. Instructed pt/family/caregiver to report excessive draining, loose bandage, wet dressing, severe pain or tingling in toes. Applied [employer] dressing below the knee to left lower leg. Unna Boot(s) were applied per  Guidelines.     Response to treatment: Well tolerated by patient      Electronically signed by Juve Stewart LPN on 4/56/7244 at 69:96 AM

## 2022-04-19 NOTE — WOUND CARE
04/15/22 0850   Wound Leg lower Left;Lateral;Superior   Date First Assessed/Time First Assessed: 03/18/22 1207   Present on Hospital Admission: Yes  Primary Wound Type: Traumatic  Location: Leg lower  Wound Location Orientation: Left;Lateral;Superior   Wound Image    Wound Etiology Traumatic   Dressing Status Intact;Breakthrough drainage noted;New drainage noted; Old drainage noted   Cleansed Wound cleanser   Dressing/Treatment Alginate;Alginate with Ag;Barrier film;Coban/self-adherent bandages; Collagen; Other (Comment)  (2 layer compression wrap)   Dressing Change Due 04/22/22   Wound Length (cm) 3 cm   Wound Width (cm) 1.8 cm   Wound Depth (cm) 0.2 cm   Wound Surface Area (cm^2) 5.4 cm^2   Change in Wound Size % -50   Wound Volume (cm^3) 1.08 cm^3   Wound Healing % -50   Wound Assessment Denuded;Fibrin;Granulation tissue;Pink/red   Drainage Amount Large   Drainage Description Serosanguinous   Wound Odor None   Britany-Wound/Incision Assessment Blanchable erythema   Edges Attached edges;Flat/open edges   Wound Thickness Description Full thickness   Wound Leg lower Left;Lateral;Inferior   Date First Assessed/Time First Assessed: 02/25/22 1200   Present on Hospital Admission: Yes  Primary Wound Type: Traumatic  Location: Leg lower  Wound Location Orientation: Left;Lateral;Inferior   Wound Image    Wound Etiology Traumatic   Dressing Status Intact;Breakthrough drainage noted; Old drainage noted   Cleansed Wound cleanser   Dressing/Treatment Alginate;Alginate with Ag;Barrier film;Collagen;Roll gauze   Dressing Change Due 04/22/22   Wound Length (cm) 5.6 cm   Wound Width (cm) 3.2 cm   Wound Depth (cm) 0.2 cm   Wound Surface Area (cm^2) 17.92 cm^2   Change in Wound Size % -814.29   Wound Volume (cm^3) 3.584 cm^3   Wound Healing % -814   Wound Assessment Denuded;Fibrin;Granulation tissue;Pink/red   Drainage Amount Large   Drainage Description Serosanguinous   Wound Odor None   Britany-Wound/Incision Assessment Blanchable erythema;Denuded;Fragile; Intact   Edges Attached edges;Flat/open edges   Wound Thickness Description Full thickness

## 2022-04-22 ENCOUNTER — HOSPITAL ENCOUNTER (OUTPATIENT)
Dept: WOUND CARE | Age: 67
Discharge: HOME | End: 2022-04-22
Attending: PODIATRIST
Payer: COMMERCIAL

## 2022-04-22 VITALS
HEART RATE: 70 BPM | OXYGEN SATURATION: 100 % | DIASTOLIC BLOOD PRESSURE: 72 MMHG | RESPIRATION RATE: 18 BRPM | TEMPERATURE: 97.8 F | SYSTOLIC BLOOD PRESSURE: 169 MMHG

## 2022-04-22 DIAGNOSIS — L97.922 ULCER OF LEFT LOWER LEG, WITH FAT LAYER EXPOSED (HCC): ICD-10-CM

## 2022-04-22 DIAGNOSIS — R60.0 EDEMA, LEG: ICD-10-CM

## 2022-04-22 DIAGNOSIS — I87.2 PERIPHERAL VENOUS INSUFFICIENCY: Primary | ICD-10-CM

## 2022-04-22 PROCEDURE — 11045 DBRDMT SUBQ TISS EACH ADDL: CPT

## 2022-04-22 PROCEDURE — 87077 CULTURE AEROBIC IDENTIFY: CPT

## 2022-04-22 PROCEDURE — 87205 SMEAR GRAM STAIN: CPT

## 2022-04-22 PROCEDURE — 87147 CULTURE TYPE IMMUNOLOGIC: CPT

## 2022-04-22 PROCEDURE — 11042 DBRDMT SUBQ TIS 1ST 20SQCM/<: CPT

## 2022-04-22 PROCEDURE — 87186 SC STD MICRODIL/AGAR DIL: CPT

## 2022-04-22 PROCEDURE — 87075 CULTR BACTERIA EXCEPT BLOOD: CPT

## 2022-04-22 RX ORDER — LIDOCAINE HYDROCHLORIDE 20 MG/ML
JELLY TOPICAL ONCE
Status: COMPLETED | OUTPATIENT
Start: 2022-04-22 | End: 2022-04-22

## 2022-04-22 RX ORDER — CLOBETASOL PROPIONATE 0.5 MG/G
OINTMENT TOPICAL ONCE
Status: CANCELLED | OUTPATIENT
Start: 2022-04-22 | End: 2022-04-22

## 2022-04-22 RX ORDER — BACITRACIN ZINC AND POLYMYXIN B SULFATE 500; 1000 [USP'U]/G; [USP'U]/G
OINTMENT TOPICAL ONCE
Status: CANCELLED | OUTPATIENT
Start: 2022-04-22 | End: 2022-04-22

## 2022-04-22 RX ORDER — LIDOCAINE HYDROCHLORIDE 20 MG/ML
JELLY TOPICAL ONCE
Status: CANCELLED | OUTPATIENT
Start: 2022-04-22 | End: 2022-04-22

## 2022-04-22 RX ORDER — MUPIROCIN 20 MG/G
OINTMENT TOPICAL ONCE
Status: CANCELLED | OUTPATIENT
Start: 2022-04-22 | End: 2022-04-22

## 2022-04-22 RX ORDER — SILVER SULFADIAZINE 10 G/1000G
CREAM TOPICAL ONCE
Status: CANCELLED | OUTPATIENT
Start: 2022-04-22 | End: 2022-04-22

## 2022-04-22 RX ADMIN — LIDOCAINE HYDROCHLORIDE: 20 JELLY TOPICAL at 08:45

## 2022-04-24 LAB
BACTERIA SPEC CULT: NORMAL
SERVICE CMNT-IMP: NORMAL

## 2022-04-25 LAB
BACTERIA SPEC CULT: ABNORMAL
GRAM STN SPEC: ABNORMAL
GRAM STN SPEC: ABNORMAL
SERVICE CMNT-IMP: ABNORMAL

## 2022-04-26 ENCOUNTER — TELEPHONE (OUTPATIENT)
Dept: WOUND CARE | Age: 67
End: 2022-04-26

## 2022-04-26 DIAGNOSIS — I87.2 PERIPHERAL VENOUS INSUFFICIENCY: ICD-10-CM

## 2022-04-26 DIAGNOSIS — L97.922 ULCER OF LEFT LOWER LEG, WITH FAT LAYER EXPOSED (HCC): ICD-10-CM

## 2022-04-26 DIAGNOSIS — R60.0 EDEMA, LEG: ICD-10-CM

## 2022-04-26 NOTE — TELEPHONE ENCOUNTER
Call placed to patient patient stated that he has been having increased pain in his leg and wanted to know what he needed to do. RN stressed the need to elevate leg when at home. Also recommended patient try taking tylenol more frequently can take up to 4 00 mg a  Day. Patient wondered if he had an infection RN advised him to make sure he attended his appointment this Friday to discuss whether he needed antibiotics.   Patient stated he would be there

## 2022-04-29 ENCOUNTER — HOSPITAL ENCOUNTER (OUTPATIENT)
Dept: WOUND CARE | Age: 67
Discharge: HOME | End: 2022-04-29
Attending: PODIATRIST
Payer: COMMERCIAL

## 2022-04-29 ENCOUNTER — TRANSCRIBE ORDER (OUTPATIENT)
Dept: WOUND CARE | Age: 67
End: 2022-04-29

## 2022-04-29 DIAGNOSIS — L97.822 SKIN ULCER OF LEFT KNEE WITH FAT LAYER EXPOSED (HCC): Primary | ICD-10-CM

## 2022-04-29 DIAGNOSIS — E11.622 ULCER OF LOWER EXTREMITY DUE TO DIABETES MELLITUS (HCC): ICD-10-CM

## 2022-04-29 DIAGNOSIS — L98.499 CHRONIC ULCER OF SKIN (HCC): ICD-10-CM

## 2022-04-29 DIAGNOSIS — L97.909 ULCER OF LOWER EXTREMITY DUE TO DIABETES MELLITUS (HCC): ICD-10-CM

## 2022-04-29 DIAGNOSIS — L97.922 ULCER OF LEFT LOWER LEG, WITH FAT LAYER EXPOSED (HCC): ICD-10-CM

## 2022-04-29 DIAGNOSIS — R60.0 EDEMA, LEG: ICD-10-CM

## 2022-04-29 DIAGNOSIS — I87.2 PERIPHERAL VENOUS INSUFFICIENCY: Primary | ICD-10-CM

## 2022-04-29 PROCEDURE — 29581 APPL MULTLAYER CMPRN SYS LEG: CPT

## 2022-04-29 RX ORDER — CLOBETASOL PROPIONATE 0.5 MG/G
OINTMENT TOPICAL ONCE
Status: CANCELLED | OUTPATIENT
Start: 2022-04-29 | End: 2022-04-29

## 2022-04-29 RX ORDER — BACITRACIN ZINC AND POLYMYXIN B SULFATE 500; 1000 [USP'U]/G; [USP'U]/G
OINTMENT TOPICAL ONCE
Status: CANCELLED | OUTPATIENT
Start: 2022-04-29 | End: 2022-04-29

## 2022-04-29 RX ORDER — LIDOCAINE HYDROCHLORIDE 20 MG/ML
JELLY TOPICAL ONCE
Status: CANCELLED | OUTPATIENT
Start: 2022-04-29 | End: 2022-04-29

## 2022-04-29 RX ORDER — SILVER SULFADIAZINE 10 G/1000G
CREAM TOPICAL ONCE
Status: CANCELLED | OUTPATIENT
Start: 2022-04-29 | End: 2022-04-29

## 2022-04-29 RX ORDER — LIDOCAINE HYDROCHLORIDE 20 MG/ML
JELLY TOPICAL ONCE
Status: COMPLETED | OUTPATIENT
Start: 2022-04-29 | End: 2022-04-29

## 2022-04-29 RX ORDER — MUPIROCIN 20 MG/G
OINTMENT TOPICAL ONCE
Status: CANCELLED | OUTPATIENT
Start: 2022-04-29 | End: 2022-04-29

## 2022-04-29 RX ADMIN — LIDOCAINE HYDROCHLORIDE: 20 JELLY TOPICAL at 08:45

## 2022-04-29 NOTE — WOUND CARE
04/29/22 0849   Wound Leg lower Left;Lateral;Superior   Date First Assessed/Time First Assessed: 03/18/22 1207   Present on Hospital Admission: Yes  Primary Wound Type: Traumatic  Location: Leg lower  Wound Location Orientation: Left;Lateral;Superior   Wound Image   (Please see picture below)   Wound Etiology Traumatic   Dressing Status Intact   Cleansed Wound cleanser   Dressing/Treatment Alginate with Ag;Collagen with Ag;Dry dressing;Roll gauze  (2 laye wrap)   Dressing Change Due 05/05/22   Wound Length (cm) 2.9 cm   Wound Width (cm) 2.2 cm   Wound Depth (cm) 0.1 cm   Wound Surface Area (cm^2) 6.38 cm^2   Change in Wound Size % -77.22   Wound Volume (cm^3) 0.638 cm^3   Wound Healing % 11   Wound Assessment Granulation tissue;Pink/red   Drainage Amount Moderate   Drainage Description Serosanguinous   Wound Odor None   Britany-Wound/Incision Assessment Blanchable erythema   Edges Defined edges   Wound Thickness Description Full thickness   Wound Leg lower Left;Lateral;Inferior   Date First Assessed/Time First Assessed: 02/25/22 1200   Present on Hospital Admission: Yes  Primary Wound Type: Traumatic  Location: Leg lower  Wound Location Orientation: Left;Lateral;Inferior   Wound Image    Wound Etiology Traumatic   Dressing Status Intact;Breakthrough drainage noted   Cleansed Wound cleanser   Dressing/Treatment Alginate with Ag;Collagen with Ag;Dry dressing;Roll gauze  (2 layer wrap)   Dressing Change Due 05/05/22   Wound Length (cm) 5.5 cm   Wound Width (cm) 3.1 cm   Wound Depth (cm) 0.1 cm   Wound Surface Area (cm^2) 17.05 cm^2   Change in Wound Size % -769.9   Wound Volume (cm^3) 1.705 cm^3   Wound Healing % -335   Wound Assessment Granulation tissue;Pink/red   Drainage Amount Moderate   Drainage Description Serosanguinous   Wound Odor None   Britany-Wound/Incision Assessment Blanchable erythema   Edges Defined edges   Wound Thickness Description Full thickness     Multilayer Compression Wrap   (Not Unna) Below the Knee    NAME:  Li Chávez OF BIRTH:  1955  MEDICAL RECORD NUMBER:  [id number]  DATE:  4/29/2022    Removed old Multilayer wrap if indicated and wash leg with mild soap/water. Applied moisturizing agent to dry skin as needed. Applied primary and secondary dressing as ordered. Applied multilayered dressing below the knee to left lower leg. Instructed patient/caregiver not to remove dressing and to keep it clean and dry. Instructed patient/caregiver on complications to report to provider, such as pain, numbness in toes, heavy drainage, and slippage of dressing. Instructed patient on purpose of compression dressing and on activity and exercise recommendations.     Response to treatment: Well tolerated by patient       Electronically signed by Bhargavi Guerra RN on 4/29/2022 at 10:06 AM

## 2022-05-01 NOTE — WOUND CARE
Debridement Wound Care        Problem List Items Addressed This Visit        Circulatory    Peripheral venous insufficiency - Primary       Integumentary    Ulcer of left lower leg, with fat layer exposed ([address]       Other    Edema, leg          Procedure Note  Indications:  Based on my examination of this patient's wound(s)/ulcer(s) today, debridement is required to promote healing and evaluate the wound base. Patient is a 74 yr IDDM, returning to the [address] [place] [address] for continued follow up treatment on his traumatic open ulcers on the outside left lateral Superior leg, measuring (2 cm x 2.1 cm x 0.1 cm) and the left lateral inferior leg measuring (6 cm x 3.1 cm x 0.2 cm) with positive serosanguineous drainage,  positive erythema. Sharp debridement is required on the left leg open ulcers, to remove all the fibrotic tissue, to the subcutaneous level to a healthy bleeding base. Post debridement measurement on the left lateral superior leg  (2.1 cm x 2.2 cm x 0.1 cm) round and measurement on the left lateral inferior leg measurement (5.8 cm x 3.2 cm x 0.2 cm)   Apply Alginate with Ag, Collagen, compressive dressing. Performed by:  Gerson Burleson DPM    Consent obtained: Yes    Time out taken: Yes    Debridement: Excisional    Using curette the wound(s)/ulcer(s) was/were sharply debrided down through and including the removal of    subcutaneous tissue    Devitalized Tissue Debrided: slough    Pre Debridement Measurements:  Are located in the Wound/Ulcer Documentation Flow Sheet    Wound/Ulcer #: 2    Post Debridement Measurements:  Wound/Ulcer Descriptions are Pre Debridement except measurements:Diabetic ulcer, limited to breakdown of skin    Wound Leg lower Left;Lateral;Inferior (Active)   Wound Image   04/29/22 0849   Wound Etiology Traumatic 04/29/22 0849   Dressing Status Intact;Breakthrough drainage noted 04/29/22 0849   Cleansed Wound cleanser 04/29/22 0849   Dressing/Treatment Alginate with Ag;Collagen with Ag;Dry dressing;Roll gauze 04/29/22 0849   Dressing Change Due 05/05/22 04/29/22 0849   Wound Length (cm) 5.5 cm 04/29/22 0849   Wound Width (cm) 3.1 cm 04/29/22 0849   Wound Depth (cm) 0.1 cm 04/29/22 0849   Wound Surface Area (cm^2) 17.05 cm^2 04/29/22 0849   Change in Wound Size % -769.9 04/29/22 0849   Wound Volume (cm^3) 1.705 cm^3 04/29/22 0849   Wound Healing % -335 04/29/22 0849   Post-Procedure Length (cm) 5.8 cm 04/22/22 0901   Post-Procedure Width (cm) 3.2 cm 04/22/22 0901   Post-Procedure Depth (cm) 0.2 cm 04/22/22 0901   Post-Procedure Surface Area (cm^2) 18.56 cm^2 04/22/22 0901   Post-Procedure Volume (cm^3) 3.712 cm^3 04/22/22 0901   Wound Assessment Granulation tissue;Pink/red 04/29/22 0849   Drainage Amount Moderate 04/29/22 0849   Drainage Description Serosanguinous 04/29/22 0849   Wound Odor None 04/29/22 0849   Britany-Wound/Incision Assessment Blanchable erythema 04/29/22 0849   Edges Defined edges 04/29/22 0849   Wound Thickness Description Full thickness 04/29/22 0849   Number of days: 65       Wound Leg lower Left;Lateral;Superior (Active)   Wound Image   04/22/22 0901   Wound Etiology Traumatic 04/29/22 0849   Dressing Status Intact 04/29/22 0849   Cleansed Wound cleanser 04/29/22 0849   Dressing/Treatment Alginate with Ag;Collagen with Ag;Dry dressing;Roll gauze 04/29/22 0849   Dressing Change Due 05/05/22 04/29/22 0849   Wound Length (cm) 2.9 cm 04/29/22 0849   Wound Width (cm) 2.2 cm 04/29/22 0849   Wound Depth (cm) 0.1 cm 04/29/22 0849   Wound Surface Area (cm^2) 6.38 cm^2 04/29/22 0849   Change in Wound Size % -77.22 04/29/22 0849   Wound Volume (cm^3) 0.638 cm^3 04/29/22 0849   Wound Healing % 11 04/29/22 0849   Post-Procedure Length (cm) 2.1 cm 04/22/22 0901   Post-Procedure Width (cm) 2.2 cm 04/22/22 0901   Post-Procedure Depth (cm) 0.1 cm 04/22/22 0901   Post-Procedure Surface Area (cm^2) 4.62 cm^2 04/22/22 0901   Post-Procedure Volume (cm^3) 0.462 cm^3 04/22/22 0901   Wound Assessment Granulation tissue;Pink/red 04/29/22 0849   Drainage Amount Moderate 04/29/22 0849   Drainage Description Serosanguinous 04/29/22 0849   Wound Odor None 04/29/22 0849   Britany-Wound/Incision Assessment Blanchable erythema 04/29/22 0849   Edges Defined edges 04/29/22 0849   Wound Thickness Description Full thickness 04/29/22 0849   Number of days: 44        Percent of Wound(s)/Ulcer(s) Debrided: 100%    Total Surface Area Debrided:   sq cm     Diabetic/Pressure/Non Pressure Ulcers only:  Ulcer:     Estimated Blood Loss:  Minimal     Hemostasis Achieved: Pressure    Procedural Pain: 2 / 10     Post Procedural Pain: 2 / 10     Response to treatment: Well tolerated by patient

## 2022-05-03 NOTE — WOUND CARE
04/22/22 0901   Wound Leg lower Left;Lateral;Superior   Date First Assessed/Time First Assessed: 03/18/22 1207   Present on Hospital Admission: Yes  Primary Wound Type: Traumatic  Location: Leg lower  Wound Location Orientation: Left;Lateral;Superior   Wound Image    Wound Etiology Traumatic   Dressing Status Intact   Cleansed Wound cleanser   Dressing/Treatment Collagen with Ag;Dry dressing;Hydrofera Blue  (2 layer wrap)   Dressing Change Due 04/22/22   Wound Length (cm) 2 cm   Wound Width (cm) 2.1 cm   Wound Depth (cm) 0.1 cm   Wound Surface Area (cm^2) 4.2 cm^2   Change in Wound Size % -16.67   Wound Volume (cm^3) 0.42 cm^3   Wound Healing % 42   Post-Procedure Length (cm) 2.1 cm   Post-Procedure Width (cm) 2.2 cm   Post-Procedure Depth (cm) 0.1 cm   Post-Procedure Surface Area (cm^2) 4.62 cm^2   Post-Procedure Volume (cm^3) 0.462 cm^3   Wound Assessment Denuded;Fibrin;Granulation tissue   Drainage Amount Large   Drainage Description Serosanguinous   Wound Odor None   Britany-Wound/Incision Assessment Blanchable erythema   Edges Attached edges   Wound Thickness Description Full thickness   Wound Leg lower Left;Lateral;Inferior   Date First Assessed/Time First Assessed: 02/25/22 1200   Present on Hospital Admission: Yes  Primary Wound Type: Traumatic  Location: Leg lower  Wound Location Orientation: Left;Lateral;Inferior   Wound Image   (see above picture)   Wound Etiology Traumatic   Dressing Status Intact   Cleansed Wound cleanser   Dressing/Treatment Collagen with Ag;Hydrofera Blue  (2 layer wrap)   Dressing Change Due 04/29/22   Wound Length (cm) 6 cm   Wound Width (cm) 3.1 cm   Wound Depth (cm) 0.2 cm   Wound Surface Area (cm^2) 18.6 cm^2   Change in Wound Size % -848.98   Wound Volume (cm^3) 3.72 cm^3   Wound Healing % -849   Post-Procedure Length (cm) 5.8 cm   Post-Procedure Width (cm) 3.2 cm   Post-Procedure Depth (cm) 0.2 cm   Post-Procedure Surface Area (cm^2) 18.56 cm^2   Post-Procedure Volume (cm^3) 3.712 cm^3   Wound Assessment Denuded;Devitalized tissue   Drainage Amount Large   Drainage Description Serosanguinous   Wound Odor None   Britany-Wound/Incision Assessment Blanchable erythema   Edges Attached edges;Flat/open edges   Wound Thickness Description Full thickness     Multilayer Compression Wrap   (Not Unna) Below the Knee    NAME:  Melvin Blankenship OF BIRTH:  1955  MEDICAL RECORD NUMBER:  [id number]  DATE:  4/22/2022    Removed old Multilayer wrap if indicated and wash leg with mild soap/water. Applied moisturizing agent to dry skin as needed. Applied primary and secondary dressing as ordered. Applied multilayered dressing below the knee to left lower leg. Instructed patient/caregiver not to remove dressing and to keep it clean and dry. Instructed patient/caregiver on complications to report to provider, such as pain, numbness in toes, heavy drainage, and slippage of dressing. Instructed patient on purpose of compression dressing and on activity and exercise recommendations.     Response to treatment: Well tolerated by patient       Electronically signed by Estevan Badillo RN on 4/22/2022 at 9:30 AM

## 2022-05-03 NOTE — PROGRESS NOTES
Discharge inst given and reviewed with pt and family member, both verbalize understanding KATELIN Wang @ [facility])   • Tony Owens DO, [facility]  Interventional Cardiology   Pager # [phone]

## 2022-05-05 ENCOUNTER — HOSPITAL ENCOUNTER (OUTPATIENT)
Dept: VASCULAR SURGERY | Age: 67
Discharge: HOME | End: 2022-05-05
Attending: PODIATRIST
Payer: COMMERCIAL

## 2022-05-05 ENCOUNTER — HOSPITAL ENCOUNTER (OUTPATIENT)
Dept: WOUND CARE | Age: 67
Discharge: HOME | End: 2022-05-05
Attending: PODIATRIST
Payer: COMMERCIAL

## 2022-05-05 VITALS
SYSTOLIC BLOOD PRESSURE: 156 MMHG | RESPIRATION RATE: 18 BRPM | TEMPERATURE: 97.8 F | DIASTOLIC BLOOD PRESSURE: 69 MMHG | HEART RATE: 90 BPM | OXYGEN SATURATION: 100 %

## 2022-05-05 DIAGNOSIS — I87.2 PERIPHERAL VENOUS INSUFFICIENCY: Primary | ICD-10-CM

## 2022-05-05 DIAGNOSIS — L97.922 ULCER OF LEFT LOWER LEG, WITH FAT LAYER EXPOSED (HCC): ICD-10-CM

## 2022-05-05 DIAGNOSIS — R60.0 EDEMA, LEG: ICD-10-CM

## 2022-05-05 LAB
LEFT ARM BP: 145 MMHG
LEFT TBI: 0.89
LEFT TOE PRESSURE: 137 MMHG
RIGHT ABI: 1.31
RIGHT ANTERIOR TIBIAL: 201 MMHG
RIGHT ARM BP: 154 MMHG
RIGHT POSTERIOR TIBIAL: 202 MMHG
RIGHT TBI: 0.94
RIGHT TOE PRESSURE: 144 MMHG

## 2022-05-05 PROCEDURE — 93922 UPR/L XTREMITY ART 2 LEVELS: CPT

## 2022-05-05 PROCEDURE — 29581 APPL MULTLAYER CMPRN SYS LEG: CPT

## 2022-05-05 RX ORDER — CLOBETASOL PROPIONATE 0.5 MG/G
OINTMENT TOPICAL ONCE
Status: CANCELLED | OUTPATIENT
Start: 2022-05-05 | End: 2022-05-05

## 2022-05-05 RX ORDER — SILVER SULFADIAZINE 10 G/1000G
CREAM TOPICAL ONCE
Status: CANCELLED | OUTPATIENT
Start: 2022-05-05 | End: 2022-05-05

## 2022-05-05 RX ORDER — BACITRACIN ZINC AND POLYMYXIN B SULFATE 500; 1000 [USP'U]/G; [USP'U]/G
OINTMENT TOPICAL ONCE
Status: CANCELLED | OUTPATIENT
Start: 2022-05-05 | End: 2022-05-05

## 2022-05-05 RX ORDER — MUPIROCIN 20 MG/G
OINTMENT TOPICAL ONCE
Status: CANCELLED | OUTPATIENT
Start: 2022-05-05 | End: 2022-05-05

## 2022-05-05 RX ORDER — LIDOCAINE HYDROCHLORIDE 20 MG/ML
JELLY TOPICAL ONCE
Status: CANCELLED | OUTPATIENT
Start: 2022-05-05 | End: 2022-05-05

## 2022-05-05 NOTE — WOUND CARE
05/05/22 1043   Wound Leg lower Left;Lateral;Superior   Date First Assessed/Time First Assessed: 03/18/22 1207   Present on Hospital Admission: Yes  Primary Wound Type: Traumatic  Location: Leg lower  Wound Location Orientation: Left;Lateral;Superior   Wound Image    Wound Etiology Traumatic   Dressing Status Intact   Cleansed Wound cleanser   Dressing/Treatment Alginate with Ag;Collagen with Ag;Dry dressing;Roll gauze;Zinc paste  ( 2 layer wrap)   Dressing Change Due 05/13/22   Wound Length (cm) 2.5 cm   Wound Width (cm) 2 cm   Wound Depth (cm) 0.1 cm   Wound Surface Area (cm^2) 5 cm^2   Change in Wound Size % -38.89   Wound Volume (cm^3) 0.5 cm^3   Wound Healing % 31   Wound Assessment Granulation tissue;Pink/red   Drainage Amount Moderate   Drainage Description Thick; Serosanguinous   Wound Odor None   Britany-Wound/Incision Assessment Blanchable erythema   Edges Defined edges   Wound Thickness Description Full thickness   Wound Leg lower Left;Lateral;Inferior   Date First Assessed/Time First Assessed: 02/25/22 1200   Present on Hospital Admission: Yes  Primary Wound Type: Traumatic  Location: Leg lower  Wound Location Orientation: Left;Lateral;Inferior   Wound Image    Wound Etiology Traumatic   Dressing Status Intact;Breakthrough drainage noted   Cleansed Wound cleanser   Dressing/Treatment Alginate with Ag;Collagen with Ag;Dry dressing;Roll gauze;Zinc paste   Dressing Change Due 05/13/22   Wound Length (cm) 6.4 cm   Wound Width (cm) 3 cm   Wound Depth (cm) 0.1 cm   Wound Surface Area (cm^2) 19.2 cm^2   Change in Wound Size % -879.59   Wound Volume (cm^3) 1.92 cm^3   Wound Healing % -390   Wound Assessment Granulation tissue   Drainage Amount Moderate   Drainage Description Thick; Serosanguinous   Wound Odor None   Britany-Wound/Incision Assessment Blanchable erythema   Edges Defined edges   Wound Thickness Description Full thickness     Multilayer Compression Wrap   (Not Unna) Below the Knee    NAME:  Edita Norton Jarad Butterfield OF BIRTH:  1955  MEDICAL RECORD NUMBER:  [id number]  DATE:  5/5/2022    Removed old Multilayer wrap if indicated and wash leg with mild soap/water. Applied moisturizing agent to dry skin as needed. Applied primary and secondary dressing as ordered. Applied multilayered dressing below the knee to left lower leg. Instructed patient/caregiver not to remove dressing and to keep it clean and dry. Instructed patient/caregiver on complications to report to provider, such as pain, numbness in toes, heavy drainage, and slippage of dressing. Instructed patient on purpose of compression dressing and on activity and exercise recommendations.     Response to treatment: Well tolerated by patient       Electronically signed by Gin Dotson RN on 5/5/2022 at 10:52 AM

## 2022-05-06 DIAGNOSIS — L97.822 SKIN ULCER OF LEFT KNEE WITH FAT LAYER EXPOSED (HCC): ICD-10-CM

## 2022-05-09 NOTE — WOUND CARE
[address]   Progress Note and Procedure Note   NO Procedure      [address] RECORD NUMBER:  [id number]  AGE: 74 y.o. RACE WHITE/NON-  GENDER: male  : 1955  EPISODE DATE:  2022    Subjective:     No chief complaint on file. HISTORY of PRESENT ILLNESS JULIANNA [email or web address] [email or web address] Montilla is a 74 y.o. male who presents today for wound/ulcer evaluation. History of Wound Context: Patient is a Diabetic and returning to the [address] [place] [address] for follow up treatment on his left leg lateral ulcers. Patient states he hit his leg on a box while moving about nine months ago and the ulcers have not healed. .  A wound culture was taken on his last visit. Wound/Ulcer Pain Timing/Severity: moderate  Quality of pain: sharp  Severity:  2 / 10   Modifying Factors: None  Associated Signs/Symptoms: edema    Ulcer Identification:  Ulcer Type: traumatic    Contributing Factors: edema and venous stasis    Wound: Ulcers on the left leg resulting from hitting a box while moving. PAST MEDICAL HISTORY    Past Medical History:   Diagnosis Date    Arthritis     osteo    Asthma     adult onset    Cancer ([address]     BCC- temple    Diabetes ([address]     Type II    Hypertension     in the past    Low HDL (under 40)     Morbid obesity ([address]     Thyroid disease     hypo    Unspecified sleep apnea     uses BiPAP        PAST SURGICAL HISTORY    Past Surgical History:   Procedure Laterality Date    COLONOSCOPY N/A 2017    COLONOSCOPY, POLYPECTOMY performed by Kasey Barcenas MD at [facility] ENDOSCOPY    HX GI  2009    colonoscopy    HX HEENT Left     laser eye    HX MOHS PROCEDURES Right     HX OTHER SURGICAL Left     varicose veins    HX SHOULDER ARTHROSCOPY Right     HX SHOULDER ARTHROSCOPY Right        FAMILY HISTORY    No family history on file.     SOCIAL HISTORY    Social History     Tobacco Use    Smoking status: Former Smoker     Quit date: 3/5/1990     Years since quittin.2    Smokeless tobacco: Never Used   Substance Use Topics    Alcohol use: Yes     Comment: rarely    Drug use: No       ALLERGIES    No Known Allergies    MEDICATIONS    Current Outpatient Medications on File Prior to Encounter   Medication Sig Dispense Refill    lansoprazole (PREVACID) 15 mg capsule Take 15 mg by mouth Daily (before breakfast).  coenzyme Q-10 (CO Q-10) 200 mg capsule Take 200 mg by mouth daily.  naltrexone-buPROPion (CONTRAVE) 8-90 mg TbER ER tablet Take  by mouth. First Month: Contrave 8mg/90mg #70  Week 1 : 1 Tab AM  Week 2 : 1 Tab AM, 1 Tab PM  Week 3 : 2 Tab AM, 1 Tab PM  Week 4 : 2 Tab AM, 2 Tab PM  Week 5 and Beyond: Contrave 8mg/90mg #120   2 Tab AM, 2 Tab PM      empagliflozin (JARDIANCE) 25 mg tablet Take 25 mg by mouth daily.  ramipril (ALTACE) 5 mg capsule Take 5 mg by mouth nightly. Indications: HYPERTENSION, protect kidneys      glipiZIDE (GLUCOTROL) 10 mg tablet Take 10 mg by mouth two (2) times a day. Indications: TYPE 2 DIABETES MELLITUS      rosuvastatin (CRESTOR) 20 mg tablet Take 30 mg by mouth every seven (7) days. Indications: hypercholesterolemia      Liraglutide (VICTOZA) 0.6 mg/0.1 mL (18 mg/3 mL) sub-q pen 1.8 mg by SubCUTAneous route. Indications: TYPE 2 DIABETES MELLITUS      meloxicam (MOBIC) 15 mg tablet Take 15 mg by mouth nightly. Indications: OSTEOARTHRITIS      insulin detemir (LEVEMIR FLEXPEN) 100 unit/mL (3 mL) pen 80 Units by SubCUTAneous route nightly. Indications: TYPE 2 DIABETES MELLITUS      metFORMIN (GLUCOPHAGE) 1,000 mg tablet Take 1,000 mg by mouth two (2) times daily (with meals). One and half in the morning and one at night. Indications: TYPE 2 DIABETES MELLITUS      fenofibrate nanocrystallized (TRICOR) 145 mg tablet Take 145 mg by mouth nightly. Indications: HYPERCHOLESTEROLEMIA      levothyroxine (SYNTHROID) 175 mcg tablet Take 175 mcg by mouth Daily (before breakfast).  Indications: HYPOTHYROIDISM      aspirin 81 mg chewable tablet Take 81 mg by mouth nightly.  POTASSIUM PO Take  by mouth two (2) times a day. Indications: leg cramps      DOCOSAHEXANOIC ACID/EPA (FISH OIL PO) Take  by mouth nightly.  ascorbic acid (VITAMIN C) 500 mg tablet Take 500 mg by mouth daily.  multivitamins-minerals-lutein (CENTRUM SILVER) tab tablet Take 1 Tab by mouth daily. No current facility-administered medications on file prior to encounter. REVIEW OF SYSTEMS    A comprehensive review of systems was negative except for that written in the HPI. Objective: There were no vitals taken for this visit. Wt Readings from Last 3 Encounters:   08/05/19 130.4 kg (287 lb 9 oz)   06/23/17 130.6 kg (288 lb)   03/07/14 139.7 kg (308 lb)       PHYSICAL EXAM    Pertinent items are noted in HPI. Vascular:  Dorsalis pedis pulses are 2/4 bilateral.  Posterior tibial pulses are 2/4 bilateral.  Capillary fill time is less than 3 seconds, bilateral.  Edema is observed bilateral lower extremities. Varicosities are observed bilateral lower extremities. Derm:  Skin temperature of lower extremities warm to cool proximal to distal bilateral.  Inspection of skin shows- open ulcers on the left lateral superior leg measuring (2.9 cm x 2.2 cm x 0.1 cm) and on the left lateral inferior leg measuring (5.5 cm x 3.1 cm x 0.1 cm), with pink/red granular tissue, moderate serosanguineous drainage, blanchable erythema, negative signs of infection. Ortho:  Muscle strength 5/5 for all groups tested. Muscle tone normal. Inspection/palpation of bones - joints- muscle shows - within normal limits on the bilateral lower extremities. Neuro:  Light touch, sharp/dull, vibratory, and proprioception sensations all decreased bilateral lower extremities. Deep tendon reflexes decreased bilaterally. Assessment:   1. Ulcer on the Left Lateral Inferior leg. 2.  Ulcer on the Left Lateral Superior leg.   3.  Type II DM with Venous Insufficiency and Edema. Problem List Items Addressed This Visit        Circulatory    Peripheral venous insufficiency - Primary       Integumentary    Ulcer of left lower leg, with fat layer exposed ([address]       Other    Edema, leg          Procedure Note  Indications:  Based on my examination of this patient's wound(s)/ulcer(s) today, debridement is not required to promote healing and evaluate the wound base. Wound Leg lower Left;Lateral;Inferior (Active)   Wound Image   05/05/22 1043   Wound Etiology Traumatic 05/05/22 1043   Dressing Status Intact;Breakthrough drainage noted 05/05/22 1043   Cleansed Wound cleanser 05/05/22 1043   Dressing/Treatment Alginate with Ag;Collagen with Ag;Dry dressing;Roll gauze;Zinc paste 05/05/22 1043   Dressing Change Due 05/13/22 05/05/22 1043   Wound Length (cm) 6.4 cm 05/05/22 1043   Wound Width (cm) 3 cm 05/05/22 1043   Wound Depth (cm) 0.1 cm 05/05/22 1043   Wound Surface Area (cm^2) 19.2 cm^2 05/05/22 1043   Change in Wound Size % -879.59 05/05/22 1043   Wound Volume (cm^3) 1.92 cm^3 05/05/22 1043   Wound Healing % -390 05/05/22 1043   Post-Procedure Length (cm) 5.8 cm 04/22/22 0901   Post-Procedure Width (cm) 3.2 cm 04/22/22 0901   Post-Procedure Depth (cm) 0.2 cm 04/22/22 0901   Post-Procedure Surface Area (cm^2) 18.56 cm^2 04/22/22 0901   Post-Procedure Volume (cm^3) 3.712 cm^3 04/22/22 0901   Wound Assessment Granulation tissue 05/05/22 1043   Drainage Amount Moderate 05/05/22 1043   Drainage Description Thick; Serosanguinous 05/05/22 1043   Wound Odor None 05/05/22 1043   Britany-Wound/Incision Assessment Blanchable erythema 05/05/22 1043   Edges Defined edges 05/05/22 1043   Wound Thickness Description Full thickness 05/05/22 1043   Number of days: 73       Wound Leg lower Left;Lateral;Superior (Active)   Wound Image   05/05/22 1043   Wound Etiology Traumatic 05/05/22 1043   Dressing Status Intact 05/05/22 1043   Cleansed Wound cleanser 05/05/22 1043 Dressing/Treatment Alginate with Ag;Collagen with Ag;Dry dressing;Roll gauze;Zinc paste 05/05/22 1043   Dressing Change Due 05/13/22 05/05/22 1043   Wound Length (cm) 2.5 cm 05/05/22 1043   Wound Width (cm) 2 cm 05/05/22 1043   Wound Depth (cm) 0.1 cm 05/05/22 1043   Wound Surface Area (cm^2) 5 cm^2 05/05/22 1043   Change in Wound Size % -38.89 05/05/22 1043   Wound Volume (cm^3) 0.5 cm^3 05/05/22 1043   Wound Healing % 31 05/05/22 1043   Post-Procedure Length (cm) 2.1 cm 04/22/22 0901   Post-Procedure Width (cm) 2.2 cm 04/22/22 0901   Post-Procedure Depth (cm) 0.1 cm 04/22/22 0901   Post-Procedure Surface Area (cm^2) 4.62 cm^2 04/22/22 0901   Post-Procedure Volume (cm^3) 0.462 cm^3 04/22/22 0901   Wound Assessment Granulation tissue;Pink/red 05/05/22 1043   Drainage Amount Moderate 05/05/22 1043   Drainage Description Thick; Serosanguinous 05/05/22 1043   Wound Odor None 05/05/22 1043   Britany-Wound/Incision Assessment Blanchable erythema 05/05/22 1043   Edges Defined edges 05/05/22 1043   Wound Thickness Description Full thickness 05/05/22 1043   Number of days: 52        Plan:   1. Local wound care on the traumatic left lateral leg ulcers, cleaned with wound cleanser, apply Alginate with Ag,  Collagen with Ag, dry compressive dressing on the left lateral leg. 2.  Review the Wound Culture results with the patient is shows - No Growth. 3.  Dispense prescription for an SHANTHI/Vascular Test.  4.  Patient is to return to the [address] [place] [address] for continued follow up treatment. Treatment Note please see attached Discharge Instructions    Written patient dismissal instructions given to patient or POA.          Electronically signed by Trisha Parikh DPM on 5/9/2022 at 2:13 PM

## 2022-05-13 ENCOUNTER — HOSPITAL ENCOUNTER (OUTPATIENT)
Dept: WOUND CARE | Age: 67
Discharge: HOME | End: 2022-05-13
Attending: PODIATRIST
Payer: COMMERCIAL

## 2022-05-13 VITALS
SYSTOLIC BLOOD PRESSURE: 156 MMHG | DIASTOLIC BLOOD PRESSURE: 63 MMHG | TEMPERATURE: 97.8 F | OXYGEN SATURATION: 100 % | RESPIRATION RATE: 18 BRPM | HEART RATE: 68 BPM

## 2022-05-13 DIAGNOSIS — R60.0 EDEMA, LEG: ICD-10-CM

## 2022-05-13 DIAGNOSIS — I87.2 PERIPHERAL VENOUS INSUFFICIENCY: Primary | ICD-10-CM

## 2022-05-13 DIAGNOSIS — L97.922 ULCER OF LEFT LOWER LEG, WITH FAT LAYER EXPOSED (HCC): ICD-10-CM

## 2022-05-13 PROCEDURE — 15271 SKIN SUB GRAFT TRNK/ARM/LEG: CPT

## 2022-05-13 PROCEDURE — 15272 SKIN SUB GRAFT T/A/L ADD-ON: CPT

## 2022-05-13 RX ORDER — MUPIROCIN 20 MG/G
OINTMENT TOPICAL ONCE
Status: CANCELLED | OUTPATIENT
Start: 2022-05-13 | End: 2022-05-13

## 2022-05-13 RX ORDER — BACITRACIN ZINC AND POLYMYXIN B SULFATE 500; 1000 [USP'U]/G; [USP'U]/G
OINTMENT TOPICAL ONCE
Status: CANCELLED | OUTPATIENT
Start: 2022-05-13 | End: 2022-05-13

## 2022-05-13 RX ORDER — LIDOCAINE HYDROCHLORIDE 20 MG/ML
JELLY TOPICAL ONCE
Status: COMPLETED | OUTPATIENT
Start: 2022-05-13 | End: 2022-05-13

## 2022-05-13 RX ORDER — SILVER SULFADIAZINE 10 G/1000G
CREAM TOPICAL ONCE
Status: CANCELLED | OUTPATIENT
Start: 2022-05-13 | End: 2022-05-13

## 2022-05-13 RX ORDER — LIDOCAINE HYDROCHLORIDE 20 MG/ML
JELLY TOPICAL ONCE
Status: CANCELLED | OUTPATIENT
Start: 2022-05-13 | End: 2022-05-13

## 2022-05-13 RX ORDER — CLOBETASOL PROPIONATE 0.5 MG/G
OINTMENT TOPICAL ONCE
Status: CANCELLED | OUTPATIENT
Start: 2022-05-13 | End: 2022-05-13

## 2022-05-13 RX ADMIN — LIDOCAINE HYDROCHLORIDE: 20 JELLY TOPICAL at 08:45

## 2022-05-13 NOTE — WOUND CARE
05/13/22 0941   Wound Leg lower Left;Lateral;Superior   Date First Assessed/Time First Assessed: 03/18/22 1207   Present on Hospital Admission: Yes  Primary Wound Type: Traumatic  Location: Leg lower  Wound Location Orientation: Left;Lateral;Superior   Wound Image     Wound Etiology Traumatic   Dressing Status Intact   Cleansed Wound cleanser   Dressing/Treatment Alginate;Dry dressing;Moisturizing cream;Roll gauze;Zinc paste  (theraskin #1, steri strips, mepitel one, 2 layer wrap)   Dressing Change Due 05/20/22   Wound Length (cm) 2.7 cm   Wound Width (cm) 2.3 cm   Wound Depth (cm) 0.1 cm   Wound Surface Area (cm^2) 6.21 cm^2   Change in Wound Size % -72.5   Wound Volume (cm^3) 0.621 cm^3   Wound Healing % 14   Post-Procedure Length (cm) 2.7 cm   Post-Procedure Width (cm) 2.3 cm   Post-Procedure Depth (cm) 0.2 cm   Post-Procedure Surface Area (cm^2) 6.21 cm^2   Post-Procedure Volume (cm^3) 1.242 cm^3   Wound Assessment Granulation tissue   Drainage Amount Large   Drainage Description Thick; Other (Comment)  (bloody)   Wound Odor None   Britany-Wound/Incision Assessment Intact   Edges Defined edges   Wound Thickness Description Full thickness   Wound Leg lower Left;Lateral;Inferior   Date First Assessed/Time First Assessed: 02/25/22 1200   Present on Hospital Admission: Yes  Primary Wound Type: Traumatic  Location: Leg lower  Wound Location Orientation: Left;Lateral;Inferior   Wound Image   (see above pictures)   Wound Etiology Traumatic   Dressing Status Intact;Breakthrough drainage noted   Cleansed Wound cleanser   Dressing/Treatment Alginate;Dry dressing;Roll gauze;Steri-strips  (theraskin #1, mepitel one, 2 layer wrap)   Dressing Change Due 05/20/22   Wound Length (cm) 6 cm   Wound Width (cm) 2.7 cm   Wound Depth (cm) 0.1 cm   Wound Surface Area (cm^2) 16.2 cm^2   Change in Wound Size % -726.53   Wound Volume (cm^3) 1.62 cm^3   Wound Healing % -313   Post-Procedure Length (cm) 6.1 cm   Post-Procedure Width (cm) 2.8 cm   Post-Procedure Depth (cm) 0.1 cm   Post-Procedure Surface Area (cm^2) 17.08 cm^2   Post-Procedure Volume (cm^3) 1.708 cm^3   Wound Assessment Granulation tissue   Drainage Amount Large   Drainage Description Thick; Other (Comment)  (bloody)   Wound Odor None   Britany-Wound/Incision Assessment Intact   Edges Defined edges   Wound Thickness Description Full thickness     TheraSkin Treatment Note    NAME:  Sophia Daniel OF BIRTH:  1955  MEDICAL RECORD NUMBER:  [id number]  DATE:  5/13/2022    Goal:  Patient will receive safe and proper application of skin substitute. Patient will comply with caring for dressing, offloading and reporting complications. Expiration date of TheraSkin checked immediately prior to use. Package intact prior to use and no damage noted. Transport temperature controlled and acceptable. TheraSkin was prepared for application by removing from package. TheraSkin was rinsed 2 times in room temperature normal saline for 2 minutes each time. A 2nd saline rinse was left on the TheraSkin until the physician was ready to apply it within 120 minutes of thawing. White side goes against ulcer bed. TheraSkin was applied to left lateral leg wound and affixed with steri-strips by the physician. absorptive dressing was applied on top of non-adherent dressing. Fluffed gauze was applied. Dressing was secured with cover roll type tape to stabilize graft. Coban or ace wrap was applied to secure graft and decrease edema. Patient/caregiver was instructed not to remove dressing and to keep it clean and dry. Pt/family/caregiver was instructed on signs and symptoms of complications to report such as draining through, falling down/slipping, getting wet, or severe pain or tingling in toes. Pt/family/caregiver was instructed on need for offloading and elevation of affected extremity (if applicable) and on use of prescribed offloading device.      Thera Skin may be applied a total of 10 times per wound over a 12 week period.  Guidelines followed      Electronically signed by Bhargavi Guerra RN on 5/13/2022 at 9:53 AM      Multilayer Compression Wrap   (Not Lemmie Royals) Below the Knee    NAME:  Li Chávez OF BIRTH:  1955  MEDICAL RECORD NUMBER:  [id number]  DATE:  5/13/2022    Removed old Multilayer wrap if indicated and wash leg with mild soap/water. Applied moisturizing agent to dry skin as needed. Applied primary and secondary dressing as ordered. Applied multilayered dressing below the knee to left lower leg. Instructed patient/caregiver not to remove dressing and to keep it clean and dry. Instructed patient/caregiver on complications to report to provider, such as pain, numbness in toes, heavy drainage, and slippage of dressing. Instructed patient on purpose of compression dressing and on activity and exercise recommendations.     Response to treatment: Well tolerated by patient       Electronically signed by Bhargavi Guerra RN on 5/13/2022 at 9:54 AM

## 2022-05-18 NOTE — WOUND CARE
Procedure:  Skin Substitute Application  Patient is a Diabetic and returning to the [address] [place] [address] for continued follow up treatment on his two left leg ulcers measuring (2.7 cm x 2.3 cm x 0.1 cm) and (6.0 cm x 2.7 cm x 0.1 cm). Sharp debridement was conducted to prepare for the \"Theraskin Graft\" on his left leg ulcers. Patient also, suffers with Venous Insufficiency and Edema. Performed by: Yariel Allen DPM    Ulcer Type: traumatic    Consent obtained: Yes     Time out taken: Yes    Product Utilized: TheraSkin 39 sq/cm     Fenestrated: No    Mesher Utilized: No    Instrument(s): Curette     Skin Substitute was Applied to Ulcer Number(s):    Ulcer #: 2      Wound Leg lower Left;Lateral;Inferior (Active)   Wound Image   05/05/22 1043   Wound Etiology Traumatic 05/13/22 0941   Dressing Status Intact;Breakthrough drainage noted 05/13/22 0941   Cleansed Wound cleanser 05/13/22 0941   Dressing/Treatment Alginate;Dry dressing;Roll gauze;Steri-strips 05/13/22 0941   Dressing Change Due 05/20/22 05/13/22 0941   Wound Length (cm) 6 cm 05/13/22 0941   Wound Width (cm) 2.7 cm 05/13/22 0941   Wound Depth (cm) 0.1 cm 05/13/22 0941   Wound Surface Area (cm^2) 16.2 cm^2 05/13/22 0941   Change in Wound Size % -726.53 05/13/22 0941   Wound Volume (cm^3) 1.62 cm^3 05/13/22 0941   Wound Healing % -313 05/13/22 0941   Post-Procedure Length (cm) 6.1 cm 05/13/22 0941   Post-Procedure Width (cm) 2.8 cm 05/13/22 0941   Post-Procedure Depth (cm) 0.1 cm 05/13/22 0941   Post-Procedure Surface Area (cm^2) 17.08 cm^2 05/13/22 0941   Post-Procedure Volume (cm^3) 1.708 cm^3 05/13/22 0941   Wound Assessment Granulation tissue 05/13/22 0941   Drainage Amount Large 05/13/22 0941   Drainage Description Thick; Other (Comment) 05/13/22 0941   Wound Odor None 05/13/22 0941   Britany-Wound/Incision Assessment Intact 05/13/22 0941   Edges Defined edges 05/13/22 0941   Wound Thickness Description Full thickness 05/13/22 0941   Number of days: 82 Wound Leg lower Left;Lateral;Superior (Active)   Wound Image    05/13/22 0941   Wound Etiology Traumatic 05/13/22 0941   Dressing Status Intact 05/13/22 0941   Cleansed Wound cleanser 05/13/22 0941   Dressing/Treatment Alginate;Dry dressing;Moisturizing cream;Roll gauze;Zinc paste 05/13/22 0941   Dressing Change Due 05/20/22 05/13/22 0941   Wound Length (cm) 2.7 cm 05/13/22 0941   Wound Width (cm) 2.3 cm 05/13/22 0941   Wound Depth (cm) 0.1 cm 05/13/22 0941   Wound Surface Area (cm^2) 6.21 cm^2 05/13/22 0941   Change in Wound Size % -72.5 05/13/22 0941   Wound Volume (cm^3) 0.621 cm^3 05/13/22 0941   Wound Healing % 14 05/13/22 0941   Post-Procedure Length (cm) 2.7 cm 05/13/22 0941   Post-Procedure Width (cm) 2.3 cm 05/13/22 0941   Post-Procedure Depth (cm) 0.2 cm 05/13/22 0941   Post-Procedure Surface Area (cm^2) 6.21 cm^2 05/13/22 0941   Post-Procedure Volume (cm^3) 1.242 cm^3 05/13/22 0941   Wound Assessment Granulation tissue 05/13/22 0941   Drainage Amount Large 05/13/22 0941   Drainage Description Thick; Other (Comment) 05/13/22 0941   Wound Odor None 05/13/22 0941   Britany-Wound/Incision Assessment Intact 05/13/22 0941   Edges Defined edges 05/13/22 0941   Wound Thickness Description Full thickness 05/13/22 0941   Number of days: 61        Diabetic/Pressure/Non Pressure Ulcers:  Ulcer:   Diabetic ulcer, fat layer exposed    No  Total Surface Area of Ulcer(s) Covered  22.41 sq/cm    Was the Product Layered: No     Amount of Product Applied 39 sq/cm     Amount of Product Wasted  0 sq/cm     Reason for Waste: N/A     Surgically Fixated:  No    Secured With: Steri Strips    Procedural Pain: 2 / 10     Post Procedural Pain: 2 / 10     Response to treatment: Well tolerated by patient

## 2022-05-20 ENCOUNTER — HOSPITAL ENCOUNTER (OUTPATIENT)
Dept: WOUND CARE | Age: 67
Discharge: HOME | End: 2022-05-20
Attending: PODIATRIST
Payer: COMMERCIAL

## 2022-05-20 VITALS
HEART RATE: 58 BPM | RESPIRATION RATE: 18 BRPM | SYSTOLIC BLOOD PRESSURE: 140 MMHG | TEMPERATURE: 97.6 F | OXYGEN SATURATION: 98 % | DIASTOLIC BLOOD PRESSURE: 53 MMHG

## 2022-05-20 DIAGNOSIS — I87.2 PERIPHERAL VENOUS INSUFFICIENCY: Primary | ICD-10-CM

## 2022-05-20 DIAGNOSIS — R60.0 EDEMA, LEG: ICD-10-CM

## 2022-05-20 DIAGNOSIS — L97.922 ULCER OF LEFT LOWER LEG, WITH FAT LAYER EXPOSED (HCC): ICD-10-CM

## 2022-05-20 PROCEDURE — 29581 APPL MULTLAYER CMPRN SYS LEG: CPT

## 2022-05-20 RX ORDER — SILVER SULFADIAZINE 10 G/1000G
CREAM TOPICAL ONCE
Status: CANCELLED | OUTPATIENT
Start: 2022-05-20 | End: 2022-05-20

## 2022-05-20 RX ORDER — MUPIROCIN 20 MG/G
OINTMENT TOPICAL ONCE
Status: CANCELLED | OUTPATIENT
Start: 2022-05-20 | End: 2022-05-20

## 2022-05-20 RX ORDER — CLOBETASOL PROPIONATE 0.5 MG/G
OINTMENT TOPICAL ONCE
Status: CANCELLED | OUTPATIENT
Start: 2022-05-20 | End: 2022-05-20

## 2022-05-20 RX ORDER — BACITRACIN ZINC AND POLYMYXIN B SULFATE 500; 1000 [USP'U]/G; [USP'U]/G
OINTMENT TOPICAL ONCE
Status: CANCELLED | OUTPATIENT
Start: 2022-05-20 | End: 2022-05-20

## 2022-05-20 RX ORDER — LIDOCAINE HYDROCHLORIDE 20 MG/ML
JELLY TOPICAL ONCE
Status: CANCELLED | OUTPATIENT
Start: 2022-05-20 | End: 2022-05-20

## 2022-05-20 NOTE — WOUND CARE
05/20/22 1047   Wound Leg lower Left;Lateral;Superior   Date First Assessed/Time First Assessed: 03/18/22 1207   Present on Hospital Admission: Yes  Primary Wound Type: Traumatic  Location: Leg lower  Wound Location Orientation: Left;Lateral;Superior   Wound Image    Wound Etiology Traumatic   Dressing Status Intact   Cleansed Wound cleanser   Dressing/Treatment Alginate;Dry dressing;Roll gauze;Zinc paste  (2 layer wrap)   Dressing Change Due 05/27/22   Wound Length (cm) 2.7 cm  (Theraskin remains intact over wound)   Wound Width (cm) 2.3 cm   Wound Depth (cm) 0.1 cm   Wound Surface Area (cm^2) 6.21 cm^2   Change in Wound Size % -72.5   Wound Volume (cm^3) 0.621 cm^3   Wound Healing % 14   Wound Assessment Graft   Drainage Amount Moderate   Drainage Description Serosanguinous   Wound Odor None   Britany-Wound/Incision Assessment Intact   Edges Defined edges   Wound Thickness Description Full thickness   Wound Leg lower Left;Lateral;Inferior   Date First Assessed/Time First Assessed: 02/25/22 1200   Present on Hospital Admission: Yes  Primary Wound Type: Traumatic  Location: Leg lower  Wound Location Orientation: Left;Lateral;Inferior   Wound Image   (see above picture)   Wound Etiology Traumatic   Dressing Status Intact   Cleansed Irrigated with saline   Dressing/Treatment Alginate;Dry dressing;Roll gauze;Zinc paste  (2 layer wrap)   Dressing Change Due 05/27/22   Wound Length (cm) 6 cm  (graft remains intact)   Wound Width (cm) 2.7 cm   Wound Depth (cm) 0.1 cm   Wound Surface Area (cm^2) 16.2 cm^2   Change in Wound Size % -726.53   Wound Volume (cm^3) 1.62 cm^3   Wound Healing % -313   Wound Assessment Graft   Drainage Amount Moderate   Drainage Description Serosanguinous   Wound Odor None   Britany-Wound/Incision Assessment Intact   Edges Defined edges   Wound Thickness Description Full thickness     Multilayer Compression Wrap   (Not Unna) Below the Knee    NAME:  Jr Hoskins OF BIRTH:  1955  MEDICAL RECORD NUMBER:  [id number]  DATE:  5/20/2022    Removed old Multilayer wrap if indicated and wash leg with mild soap/water. Applied moisturizing agent to dry skin as needed. Applied primary and secondary dressing as ordered. Applied multilayered dressing below the knee to left lower leg. Instructed patient/caregiver not to remove dressing and to keep it clean and dry. Instructed patient/caregiver on complications to report to provider, such as pain, numbness in toes, heavy drainage, and slippage of dressing. Instructed patient on purpose of compression dressing and on activity and exercise recommendations.     Response to treatment: Well tolerated by patient       Electronically signed by Elizabeth Angel RN on 5/20/2022 at 10:55 AM

## 2022-05-20 NOTE — DISCHARGE INSTRUCTIONS
Discharge Instructions from  [facility]  [address] [place], [place], [facility]  [phone] Fax [phone]    NAME:  Kylie Cross OF BIRTH:  1955  MEDICAL RECORD NUMBER:  [id number]  DATE:  5/20/2022    Wound Cleansing:   Do not scrub or use excessive force. Cleanse wound prior to applying a clean dressing with:  [] Normal Saline [] Keep Wound Dry in Shower    [] Wound Cleanser   [] Cleanse wound with Mild Soap & Water  [] May Shower at Discharge   [] Other:      Topical Treatments:  Do not apply lotions, creams, or ointments to wound bed unless directed. [] Apply moisturizing lotion to skin surrounding the wound prior to dressing change.  [] Apply antifungal ointment to skin surrounding the wound prior to dressing change.  [] Apply thin film of moisture barrier ointment to skin immediately around wound. [] Other:       Dressings:           Wound Location - Left leg  [] Apply Primary Dressing:       [] MediHoney Gel [] Alginate with Silver [] Alginate   [] Collagen [] Collagen with Silver   [] Santyl with Moisten saline gauze     [] Hydrocolloid   [] MediHoney Alginate [] Foam with Silver   [] Foam   [] Hydrofera Blue    [] Mepilex Border    [] Moisten with Saline [] Hydrogel [] Mepitel     [] Bactroban/Mupirocin [] Polysporin  [] Other:    [] Pack wound loosely with  [] Iodoform   [] Plain Packing  [] Other   [] Cover and Secure with:     [] Gauze [] Jerrod [] Kerlix   [] Ace Wrap [] Cover Roll Tape [] ABD     [] Other:    Avoid contact of tape with skin.   [] Change dressing: [] Daily    [] Every Other Day [] Three times per week   [] Once a week [x] Do Not Change Dressing   [] Other:     Negative Pressure:           Wound Location:   [] Pressure@           mm/Hg  []Continuous []Intermittent   [] Black  [] White Foam [] Other:   []Change dressing: []Three times per week    []Other:     Pressure Relief:  [] When sitting, shift position or do seat lifts every 15 minutes.  [] Wheelchair cushion [] Specialty Bed/Mattress  [] Turn every 2 hours when in bed. Avoid position directing pressure on wound site. Limit side lying to 30 degree tilt. Limit HOB elevation to 30 degrees. Compression:  Apply: [] Multilayer Compression Wrap Applied in Clinic []RightLeg []Left Leg   [] Multi-layer compression. Do not get leg(s) with wrap wet. If wraps become too tight call the center or completely remove the wrap. [] Elevate leg(s) above the level of the heart when sitting. [] Avoid prolonged standing in one place. Off-Loading:   [] Off-loading when [] walking  [] in bed [] sitting  [] Total non-weight bearing  [] Right Leg  [] Left Leg   [] Assistive Device [] Walker [] Cane  [] Wheelchair  [] Crutches   [] Surgical shoe    [] Podus Boot(s)   [] Foam Boot(s)  [] Roll About    [] Cast Boot [] CROW Boot  [] Other:    Contact Cast:  Apply: [] Total Contact Cast Applied in Clinic []RightLeg []Left Leg   [] Do not get cast wet. Contact center or go to emergency room if there is a foul odor or becomes uncomfortable due to feeling tight or swelling. Do not use objects inside of cast to scratch.       Dietary:  [] Diet as tolerated: [] Calorie Diabetic Diet: [] No Added Salt:  [] Increase Protein: [] Other:   Activity:  [] Activity as tolerated:  [] Patient has no activity restrictions     [] Strict Bedrest: [] Remain off Work:     [] May return to full duty work:                                   [] Return to work with restrictions:             Return Appointment:  [] Wound and dressing supply provider:   [] ECF or Home Healthcare:  [] Wound Assessment: [] Physician or NP scheduled for Wound Assessment:   [x] Return Appointment: With Dr. Gin Mccall  in [facility])  [] Ordered tests:      Electronically signed Monalisa Rolon RN on 5/20/2022 at [address] [place] [address]: Should you experience any significant changes in your wound(s) or have questions about your wound care, please contact the [address] at [address] [place] [address] 8:00 am - 4:30. If you need help with your wound outside these hours and cannot wait until we are again available, contact your PCP or go to the hospital emergency room. PLEASE NOTE: IF YOU ARE UNABLE TO OBTAIN WOUND SUPPLIES, CONTINUE TO USE THE SUPPLIES YOU HAVE AVAILABLE UNTIL YOU ARE ABLE TO REACH US. IT IS MOST IMPORTANT TO KEEP THE WOUND COVERED AT ALL TIMES.      Physician Signature:_______________________    Date: ___________ Time:  ____________

## 2022-05-27 ENCOUNTER — HOSPITAL ENCOUNTER (OUTPATIENT)
Dept: WOUND CARE | Age: 67
Discharge: HOME | End: 2022-05-27
Attending: PODIATRIST
Payer: COMMERCIAL

## 2022-05-27 VITALS
OXYGEN SATURATION: 98 % | DIASTOLIC BLOOD PRESSURE: 61 MMHG | HEART RATE: 69 BPM | SYSTOLIC BLOOD PRESSURE: 150 MMHG | RESPIRATION RATE: 18 BRPM | TEMPERATURE: 97.3 F

## 2022-05-27 DIAGNOSIS — L97.922 ULCER OF LEFT LOWER LEG, WITH FAT LAYER EXPOSED (HCC): ICD-10-CM

## 2022-05-27 DIAGNOSIS — I87.2 PERIPHERAL VENOUS INSUFFICIENCY: Primary | ICD-10-CM

## 2022-05-27 DIAGNOSIS — R60.0 EDEMA, LEG: ICD-10-CM

## 2022-05-27 PROCEDURE — 15271 SKIN SUB GRAFT TRNK/ARM/LEG: CPT

## 2022-05-27 PROCEDURE — 15272 SKIN SUB GRAFT T/A/L ADD-ON: CPT

## 2022-05-27 RX ORDER — SILVER SULFADIAZINE 10 G/1000G
CREAM TOPICAL ONCE
Status: CANCELLED | OUTPATIENT
Start: 2022-05-27 | End: 2022-05-27

## 2022-05-27 RX ORDER — BACITRACIN ZINC AND POLYMYXIN B SULFATE 500; 1000 [USP'U]/G; [USP'U]/G
OINTMENT TOPICAL ONCE
Status: CANCELLED | OUTPATIENT
Start: 2022-05-27 | End: 2022-05-27

## 2022-05-27 RX ORDER — LIDOCAINE HYDROCHLORIDE 20 MG/ML
JELLY TOPICAL ONCE
Status: COMPLETED | OUTPATIENT
Start: 2022-05-27 | End: 2022-05-27

## 2022-05-27 RX ORDER — LIDOCAINE HYDROCHLORIDE 20 MG/ML
JELLY TOPICAL ONCE
Status: CANCELLED | OUTPATIENT
Start: 2022-05-27 | End: 2022-05-27

## 2022-05-27 RX ORDER — CLOBETASOL PROPIONATE 0.5 MG/G
OINTMENT TOPICAL ONCE
Status: CANCELLED | OUTPATIENT
Start: 2022-05-27 | End: 2022-05-27

## 2022-05-27 RX ORDER — MUPIROCIN 20 MG/G
OINTMENT TOPICAL ONCE
Status: CANCELLED | OUTPATIENT
Start: 2022-05-27 | End: 2022-05-27

## 2022-05-27 RX ADMIN — LIDOCAINE HYDROCHLORIDE: 20 JELLY TOPICAL at 08:45

## 2022-05-27 NOTE — WOUND CARE
05/27/22 0859   Wound Leg lower Left;Lateral;Superior   Date First Assessed/Time First Assessed: 03/18/22 1207   Present on Hospital Admission: Yes  Primary Wound Type: Traumatic  Location: Leg lower  Wound Location Orientation: Left;Lateral;Superior   Wound Image     Wound Etiology Traumatic   Dressing Status Intact   Cleansed Wound cleanser   Dressing/Treatment Alginate;Dry dressing;Roll gauze  (graft # 2 applied, 2 layeer wrap)   Dressing Change Due 06/03/22   Wound Length (cm) 2.1 cm   Wound Width (cm) 1.9 cm   Wound Depth (cm) 0.1 cm   Wound Surface Area (cm^2) 3.99 cm^2   Change in Wound Size % -10.83   Wound Volume (cm^3) 0.399 cm^3   Wound Healing % 45   Post-Procedure Length (cm) 2.2 cm   Post-Procedure Width (cm) 2 cm   Post-Procedure Depth (cm) 0.1 cm   Post-Procedure Surface Area (cm^2) 4.4 cm^2   Post-Procedure Volume (cm^3) 0.44 cm^3   Wound Assessment Graft   Drainage Amount Moderate   Drainage Description Serosanguinous   Wound Odor None   Britany-Wound/Incision Assessment Intact   Edges Defined edges   Wound Thickness Description Full thickness   Wound Leg lower Left;Lateral;Inferior   Date First Assessed/Time First Assessed: 02/25/22 1200   Present on Hospital Admission: Yes  Primary Wound Type: Traumatic  Location: Leg lower  Wound Location Orientation: Left;Lateral;Inferior   Wound Image     Wound Etiology Traumatic   Dressing Status Intact   Cleansed Irrigated with saline   Dressing/Treatment Alginate;Dry dressing;Roll gauze  (graft # 2, 2 layer wrap)   Dressing Change Due 06/03/22   Wound Length (cm) 5.9 cm   Wound Width (cm) 3 cm   Wound Depth (cm) 0.1 cm   Wound Surface Area (cm^2) 17.7 cm^2   Change in Wound Size % -803.06   Wound Volume (cm^3) 1.77 cm^3   Wound Healing % -352   Post-Procedure Length (cm) 6 cm   Post-Procedure Width (cm) 3.1 cm   Post-Procedure Depth (cm) 0.1 cm   Post-Procedure Surface Area (cm^2) 18.6 cm^2   Post-Procedure Volume (cm^3) 1.86 cm^3   Wound Assessment Graft; Pink/red   Drainage Amount Moderate   Drainage Description Serosanguinous   Wound Odor None   Britany-Wound/Incision Assessment Intact   Edges Defined edges   Wound Thickness Description Full thickness       TheraSkin Treatment Note    NAME:  Melvin Blankenship OF BIRTH:  1955  MEDICAL RECORD NUMBER:  [id number]  DATE:  5/27/2022    Goal:  Patient will receive safe and proper application of skin substitute. Patient will comply with caring for dressing, offloading and reporting complications. Expiration date of TheraSkin checked immediately prior to use. Package intact prior to use and no damage noted. Transport temperature controlled and acceptable. TheraSkin was prepared for application by removing from package. TheraSkin was rinsed 2 times in room temperature normal saline for 2 minutes each time. A 2nd saline rinse was left on the TheraSkin until the physician was ready to apply it within 120 minutes of thawing. White side goes against ulcer bed. TheraSkin was applied to left lateral leg and affixed with steri-strips by the physician. absorptive dressing  was applied on top of non-adherent dressing. Dressing was secured with cover roll type tape to stabilize graft. Coban or ace wrap was applied to secure graft and decrease edema. Patient/caregiver was instructed not to remove dressing and to keep it clean and dry. Pt/family/caregiver was instructed on signs and symptoms of complications to report such as draining through, falling down/slipping, getting wet, or severe pain or tingling in toes. Pt/family/caregiver was instructed on need for offloading and elevation of affected extremity (if applicable) and on use of prescribed offloading device.  Thera Skin may be applied a total of 10 times per wound over a 12 week period.                    Guidelines followed      Electronically signed by Estevan Badillo RN on 5/27/2022 at 2:30 PM      Multilayer Compression Wrap   (Not Caryn Hoang) Below the Knee    NAME:  Mary Olson OF BIRTH:  1955  MEDICAL RECORD NUMBER:  [id number]  DATE:  5/27/2022    Removed old Multilayer wrap if indicated and wash leg with mild soap/water. Applied moisturizing agent to dry skin as needed. Applied primary and secondary dressing as ordered. Applied multilayered dressing below the knee to left lower leg. Instructed patient/caregiver not to remove dressing and to keep it clean and dry. Instructed patient/caregiver on complications to report to provider, such as pain, numbness in toes, heavy drainage, and slippage of dressing. Instructed patient on purpose of compression dressing and on activity and exercise recommendations.     Response to treatment: Well tolerated by patient       Electronically signed by Kaden Thomas RN on 5/27/2022 at 2:31 PM

## 2022-06-03 ENCOUNTER — HOSPITAL ENCOUNTER (OUTPATIENT)
Dept: WOUND CARE | Age: 67
Discharge: HOME | End: 2022-06-03
Attending: PODIATRIST
Payer: COMMERCIAL

## 2022-06-03 VITALS — HEART RATE: 70 BPM | RESPIRATION RATE: 18 BRPM | TEMPERATURE: 98.8 F | OXYGEN SATURATION: 100 %

## 2022-06-03 DIAGNOSIS — I87.2 PERIPHERAL VENOUS INSUFFICIENCY: Primary | ICD-10-CM

## 2022-06-03 DIAGNOSIS — L97.922 ULCER OF LEFT LOWER LEG, WITH FAT LAYER EXPOSED (HCC): ICD-10-CM

## 2022-06-03 DIAGNOSIS — R60.0 EDEMA, LEG: ICD-10-CM

## 2022-06-03 PROCEDURE — 29581 APPL MULTLAYER CMPRN SYS LEG: CPT

## 2022-06-03 NOTE — WOUND CARE
Multilayer Compression Wrap   (Not Unna) Below the Knee    NAME:  Yris Quinones OF BIRTH:  1955  MEDICAL RECORD NUMBER:  [id number]  DATE:  6/3/2022    Removed old Multilayer wrap if indicated and wash leg with mild soap/water. Applied moisturizing agent to dry skin as needed. Applied primary and secondary dressing as ordered. Applied multilayered dressing below the knee to left lower leg. Instructed patient/caregiver not to remove dressing and to keep it clean and dry. Instructed patient/caregiver on complications to report to provider, such as pain, numbness in toes, heavy drainage, and slippage of dressing. Instructed patient on purpose of compression dressing and on activity and exercise recommendations.     Response to treatment: Well tolerated by patient       Electronically signed by Eusebia Cook LPN on 3/3/2916 at 64:80 AM

## 2022-06-10 ENCOUNTER — HOSPITAL ENCOUNTER (OUTPATIENT)
Dept: WOUND CARE | Age: 67
Discharge: HOME | End: 2022-06-10
Attending: PODIATRIST
Payer: COMMERCIAL

## 2022-06-10 VITALS
RESPIRATION RATE: 18 BRPM | TEMPERATURE: 97.9 F | SYSTOLIC BLOOD PRESSURE: 149 MMHG | OXYGEN SATURATION: 99 % | HEART RATE: 64 BPM | DIASTOLIC BLOOD PRESSURE: 55 MMHG

## 2022-06-10 PROCEDURE — 15271 SKIN SUB GRAFT TRNK/ARM/LEG: CPT

## 2022-06-10 PROCEDURE — 15272 SKIN SUB GRAFT T/A/L ADD-ON: CPT

## 2022-06-10 RX ORDER — CLOBETASOL PROPIONATE 0.5 MG/G
OINTMENT TOPICAL ONCE
Status: CANCELLED | OUTPATIENT
Start: 2022-06-10 | End: 2022-06-10

## 2022-06-10 RX ORDER — SILVER SULFADIAZINE 10 G/1000G
CREAM TOPICAL ONCE
Status: CANCELLED | OUTPATIENT
Start: 2022-06-10 | End: 2022-06-10

## 2022-06-10 RX ORDER — LIDOCAINE HYDROCHLORIDE 20 MG/ML
JELLY TOPICAL ONCE
Status: CANCELLED | OUTPATIENT
Start: 2022-06-10 | End: 2022-06-10

## 2022-06-10 RX ORDER — BACITRACIN ZINC AND POLYMYXIN B SULFATE 500; 1000 [USP'U]/G; [USP'U]/G
OINTMENT TOPICAL ONCE
Status: CANCELLED | OUTPATIENT
Start: 2022-06-10 | End: 2022-06-10

## 2022-06-10 RX ORDER — MUPIROCIN 20 MG/G
OINTMENT TOPICAL ONCE
Status: CANCELLED | OUTPATIENT
Start: 2022-06-10 | End: 2022-06-10

## 2022-06-10 RX ORDER — LIDOCAINE HYDROCHLORIDE 20 MG/ML
JELLY TOPICAL ONCE
Status: ACTIVE | OUTPATIENT
Start: 2022-06-10 | End: 2022-06-10

## 2022-06-10 NOTE — WOUND CARE
06/10/22 0844   Wound Leg lower Left;Lateral;Superior   Date First Assessed/Time First Assessed: 03/18/22 1207   Present on Hospital Admission: Yes  Primary Wound Type: Traumatic  Location: Leg lower  Wound Location Orientation: Left;Lateral;Superior   Wound Image    Wound Etiology Traumatic   Dressing Status Intact   Cleansed Irrigated with saline; Wound cleanser   Dressing/Treatment Alginate;Dry dressing;Gauze dressing/dressing sponge;Roll gauze  (graft applied, 2 layer wrap)   Dressing Change Due 06/17/22   Wound Length (cm) 2 cm   Wound Width (cm) 2.1 cm   Wound Depth (cm) 0.1 cm   Wound Surface Area (cm^2) 4.2 cm^2   Change in Wound Size % -16.67   Wound Volume (cm^3) 0.42 cm^3   Wound Healing % 42   Post-Procedure Length (cm) 2.1 cm   Post-Procedure Width (cm) 2.2 cm   Post-Procedure Depth (cm) 0.1 cm   Post-Procedure Surface Area (cm^2) 4.62 cm^2   Post-Procedure Volume (cm^3) 0.462 cm^3   Wound Assessment Granulation tissue;Pink/red   Drainage Amount Small   Drainage Description Serosanguinous   Wound Odor None   Britany-Wound/Incision Assessment Intact   Edges Defined edges   Wound Thickness Description Full thickness   Wound Leg lower Left;Lateral;Inferior   Date First Assessed/Time First Assessed: 02/25/22 1200   Present on Hospital Admission: Yes  Primary Wound Type: Traumatic  Location: Leg lower  Wound Location Orientation: Left;Lateral;Inferior   Wound Image    Wound Etiology Traumatic   Dressing Status Intact   Cleansed Irrigated with saline; Wound cleanser   Dressing/Treatment Alginate;Dry dressing;Roll gauze  (graft applied, 2 layer wrap)   Dressing Change Due 06/17/22   Wound Length (cm) 5.8 cm   Wound Width (cm) 3.1 cm   Wound Depth (cm) 0.1 cm   Wound Surface Area (cm^2) 17.98 cm^2   Change in Wound Size % -817.35   Wound Volume (cm^3) 1.798 cm^3   Wound Healing % -359   Post-Procedure Length (cm) 5.9 cm   Post-Procedure Width (cm) 3.2 cm   Post-Procedure Depth (cm) 0.2 cm   Post-Procedure Surface Area (cm^2) 18.88 cm^2   Post-Procedure Volume (cm^3) 3.776 cm^3   Wound Assessment Graft;Pink/red   Drainage Amount Small   Drainage Description Serosanguinous   Wound Odor None   Britany-Wound/Incision Assessment Intact   Edges Defined edges   Wound Thickness Description Full thickness     TheraSkin Treatment Note    NAME:  Rebecca Huizar  YOB: 1955  MEDICAL RECORD NUMBER:  [id number]  DATE:  6/10/2022    Goal:  Patient will receive safe and proper application of skin substitute. Patient will comply with caring for dressing, offloading and reporting complications. Expiration date of TheraSkin checked immediately prior to use. Package intact prior to use and no damage noted. Transport temperature controlled and acceptable. TheraSkin was prepared for application by removing from package. TheraSkin was rinsed 2 times in room temperature normal saline for 2 minutes each time. A 2nd saline rinse was left on the TheraSkin until the physician was ready to apply it within 120 minutes of thawing. White side goes against ulcer bed. TheraSkin was applied to tleft lateral leg and affixed with steri-strips by the physician. Fluffed gauze was applied. Dressing was secured with cover roll type tape to stabilize graft. Coban or ace wrap was applied to secure graft and decrease edema. Patient/caregiver was instructed not to remove dressing and to keep it clean and dry. Pt/family/caregiver was instructed on signs and symptoms of complications to report such as draining through, falling down/slipping, getting wet, or severe pain or tingling in toes. Pt/family/caregiver was instructed on need for offloading and elevation of affected extremity (if applicable) and on use of prescribed offloading device.  Thera Skin may be applied a total of 10 times per wound over a 12 week period.                     Guidelines followed      Electronically signed by Jona Snider RN on 6/10/2022 at 2:02 PM      Multilayer Compression Wrap   (Not Unna) Below the Knee    NAME:  [id number] [place]:  1955  MEDICAL RECORD NUMBER:  [id number]  DATE:  6/10/2022    Removed old Multilayer wrap if indicated and wash leg with mild soap/water. Applied moisturizing agent to dry skin as needed. Applied primary and secondary dressing as ordered. Applied multilayered dressing below the knee to left lower leg. Instructed patient/caregiver not to remove dressing and to keep it clean and dry. Instructed patient/caregiver on complications to report to provider, such as pain, numbness in toes, heavy drainage, and slippage of dressing. Instructed patient on purpose of compression dressing and on activity and exercise recommendations.     Response to treatment: Well tolerated by patient       Electronically signed by Chary Germain RN on 6/10/2022 at 2:03 PM

## 2022-06-10 NOTE — WOUND CARE
Procedure:  Skin Substitute Application    Performed by: Yudelka Navarro DPM    Ulcer Type: venous    Consent obtained: Yes     Time out taken: Yes    Product Utilized: TheraSkin 39 sq/cm     Fenestrated: Yes    Mesher Utilized: No    Instrument(s): Curette     Skin Substitute was Applied to Ulcer Number(s):    Ulcer #: 2      Wound Leg lower Left;Lateral;Inferior (Active)   Wound Image   06/10/22 0844   Wound Etiology Traumatic 06/10/22 0844   Dressing Status Intact 06/10/22 0844   Cleansed Irrigated with saline; Wound cleanser 06/10/22 0844   Dressing/Treatment Alginate;Dry dressing;Roll gauze 06/10/22 0844   Dressing Change Due 06/17/22 06/10/22 0844   Wound Length (cm) 5.8 cm 06/10/22 0844   Wound Width (cm) 3.1 cm 06/10/22 0844   Wound Depth (cm) 0.1 cm 06/10/22 0844   Wound Surface Area (cm^2) 17.98 cm^2 06/10/22 0844   Change in Wound Size % -817.35 06/10/22 0844   Wound Volume (cm^3) 1.798 cm^3 06/10/22 0844   Wound Healing % -359 06/10/22 0844   Post-Procedure Length (cm) 5.9 cm 06/10/22 0844   Post-Procedure Width (cm) 3.2 cm 06/10/22 0844   Post-Procedure Depth (cm) 0.2 cm 06/10/22 0844   Post-Procedure Surface Area (cm^2) 18.88 cm^2 06/10/22 0844   Post-Procedure Volume (cm^3) 3.776 cm^3 06/10/22 0844   Wound Assessment Graft;Pink/red 06/10/22 0844   Drainage Amount Small 06/10/22 0844   Drainage Description Serosanguinous 06/10/22 0844   Wound Odor None 06/10/22 0844   Britany-Wound/Incision Assessment Intact 06/10/22 0844   Edges Defined edges 06/10/22 0844   Wound Thickness Description Full thickness 06/10/22 0844   Number of days: 105       Wound Leg lower Left;Lateral;Superior (Active)   Wound Image   06/10/22 0844   Wound Etiology Traumatic 06/10/22 0844   Dressing Status Intact 06/10/22 0844   Cleansed Irrigated with saline; Wound cleanser 06/10/22 0844   Dressing/Treatment Alginate;Dry dressing;Gauze dressing/dressing sponge;Roll gauze 06/10/22 0844   Dressing Change Due 06/17/22 06/10/22 0844 Wound Length (cm) 2 cm 06/10/22 0844   Wound Width (cm) 2.1 cm 06/10/22 0844   Wound Depth (cm) 0.1 cm 06/10/22 0844   Wound Surface Area (cm^2) 4.2 cm^2 06/10/22 0844   Change in Wound Size % -16.67 06/10/22 0844   Wound Volume (cm^3) 0.42 cm^3 06/10/22 0844   Wound Healing % 42 06/10/22 0844   Post-Procedure Length (cm) 2.1 cm 06/10/22 0844   Post-Procedure Width (cm) 2.2 cm 06/10/22 0844   Post-Procedure Depth (cm) 0.1 cm 06/10/22 0844   Post-Procedure Surface Area (cm^2) 4.62 cm^2 06/10/22 0844   Post-Procedure Volume (cm^3) 0.462 cm^3 06/10/22 0844   Wound Assessment Granulation tissue;Pink/red 06/10/22 0844   Drainage Amount Small 06/10/22 0844   Drainage Description Serosanguinous 06/10/22 0844   Wound Odor None 06/10/22 0844   Britany-Wound/Incision Assessment Intact 06/10/22 0844   Edges Defined edges 06/10/22 0844   Wound Thickness Description Full thickness 06/10/22 0844   Number of days: 84        Diabetic/Pressure/Non Pressure Ulcers:  Ulcer:   Non-Pressure ulcer, fat layer exposed    Yes  Total Surface Area of Ulcer(s) Covered 39 sq/cm    Was the Product Layered: No     Amount of Product Applied 39 sq/cm     Amount of Product Wasted 0 sq/cm     Reason for Waste: N/A     Surgically Fixated:  Yes    Secured With: mepatel 1    Procedural Pain: 2 / 10     Post Procedural Pain: 4 / 10     Response to treatment: Well tolerated by patient

## 2022-06-13 NOTE — WOUND CARE
Procedure:  Skin Substitute Application  Patient is a diabetic and returning to the [address] [place] [address] for a \"Theraskin Graft\" on his left leg ulcers measuring (2.1cm x 1.9cm x 0.1cm) and (5.9cm x 3cm x 0.1cm). This morning his FBS was unknown. Performed by: Marianne Sheth DPM    Ulcer Type: traumatic    Consent obtained: Yes     Time out taken: Yes    Product Utilized: TheraSkin 39 sq/cm     Fenestrated: No    Mesher Utilized: No    Instrument(s): Curette     Skin Substitute was Applied to Ulcer Number(s):    Ulcer #: 2      Wound Leg lower Left;Lateral;Inferior (Active)   Wound Image   06/10/22 0844   Wound Etiology Traumatic 06/10/22 0844   Dressing Status Intact 06/10/22 0844   Cleansed Irrigated with saline; Wound cleanser 06/10/22 0844   Dressing/Treatment Alginate;Dry dressing;Roll gauze 06/10/22 0844   Dressing Change Due 06/17/22 06/10/22 0844   Wound Length (cm) 5.8 cm 06/10/22 0844   Wound Width (cm) 3.1 cm 06/10/22 0844   Wound Depth (cm) 0.1 cm 06/10/22 0844   Wound Surface Area (cm^2) 17.98 cm^2 06/10/22 0844   Change in Wound Size % -817.35 06/10/22 0844   Wound Volume (cm^3) 1.798 cm^3 06/10/22 0844   Wound Healing % -359 06/10/22 0844   Post-Procedure Length (cm) 5.9 cm 06/10/22 0844   Post-Procedure Width (cm) 3.2 cm 06/10/22 0844   Post-Procedure Depth (cm) 0.2 cm 06/10/22 0844   Post-Procedure Surface Area (cm^2) 18.88 cm^2 06/10/22 0844   Post-Procedure Volume (cm^3) 3.776 cm^3 06/10/22 0844   Wound Assessment Graft;Pink/red 06/10/22 0844   Drainage Amount Small 06/10/22 0844   Drainage Description Serosanguinous 06/10/22 0844   Wound Odor None 06/10/22 0844   Britany-Wound/Incision Assessment Intact 06/10/22 0844   Edges Defined edges 06/10/22 0844   Wound Thickness Description Full thickness 06/10/22 0844   Number of days: 107       Wound Leg lower Left;Lateral;Superior (Active)   Wound Image   06/10/22 0844   Wound Etiology Traumatic 06/10/22 0844   Dressing Status Intact 06/10/22 0844 Cleansed Irrigated with saline; Wound cleanser 06/10/22 0844   Dressing/Treatment Alginate;Dry dressing;Gauze dressing/dressing sponge;Roll gauze 06/10/22 0844   Dressing Change Due 06/17/22 06/10/22 0844   Wound Length (cm) 2 cm 06/10/22 0844   Wound Width (cm) 2.1 cm 06/10/22 0844   Wound Depth (cm) 0.1 cm 06/10/22 0844   Wound Surface Area (cm^2) 4.2 cm^2 06/10/22 0844   Change in Wound Size % -16.67 06/10/22 0844   Wound Volume (cm^3) 0.42 cm^3 06/10/22 0844   Wound Healing % 42 06/10/22 0844   Post-Procedure Length (cm) 2.1 cm 06/10/22 0844   Post-Procedure Width (cm) 2.2 cm 06/10/22 0844   Post-Procedure Depth (cm) 0.1 cm 06/10/22 0844   Post-Procedure Surface Area (cm^2) 4.62 cm^2 06/10/22 0844   Post-Procedure Volume (cm^3) 0.462 cm^3 06/10/22 0844   Wound Assessment Granulation tissue;Pink/red 06/10/22 0844   Drainage Amount Small 06/10/22 0844   Drainage Description Serosanguinous 06/10/22 0844   Wound Odor None 06/10/22 0844   Britany-Wound/Incision Assessment Intact 06/10/22 0844   Edges Defined edges 06/10/22 0844   Wound Thickness Description Full thickness 06/10/22 0844   Number of days: 86        Diabetic/Pressure/Non Pressure Ulcers:  Ulcer:   Diabetic ulcer, fat layer exposed    Yes  Total Surface Area of Ulcer(s) Covered 16.2 sq/cm    Was the Product Layered: No     Amount of Product [id number] sq/cm     Amount of Product Wasted 0 sq/cm     Reason for Waste: N/A     Surgically Fixated:  Yes    Secured With: Mepatel 1    Procedural Pain: 2 / 10     Post Procedural Pain: 3 / 10     Response to treatment: Well tolerated by patient

## 2022-06-17 ENCOUNTER — HOSPITAL ENCOUNTER (OUTPATIENT)
Dept: WOUND CARE | Age: 67
Discharge: HOME | End: 2022-06-17
Attending: PODIATRIST
Payer: COMMERCIAL

## 2022-06-17 VITALS
SYSTOLIC BLOOD PRESSURE: 125 MMHG | RESPIRATION RATE: 18 BRPM | HEART RATE: 65 BPM | OXYGEN SATURATION: 98 % | TEMPERATURE: 97.8 F | DIASTOLIC BLOOD PRESSURE: 73 MMHG

## 2022-06-17 DIAGNOSIS — R60.0 EDEMA, LEG: ICD-10-CM

## 2022-06-17 DIAGNOSIS — I87.2 PERIPHERAL VENOUS INSUFFICIENCY: Primary | ICD-10-CM

## 2022-06-17 DIAGNOSIS — L97.922 ULCER OF LEFT LOWER LEG, WITH FAT LAYER EXPOSED (HCC): ICD-10-CM

## 2022-06-17 PROCEDURE — 29581 APPL MULTLAYER CMPRN SYS LEG: CPT

## 2022-06-17 RX ORDER — MUPIROCIN 20 MG/G
OINTMENT TOPICAL ONCE
Status: CANCELLED | OUTPATIENT
Start: 2022-06-17 | End: 2022-06-17

## 2022-06-17 RX ORDER — CLOBETASOL PROPIONATE 0.5 MG/G
OINTMENT TOPICAL ONCE
Status: CANCELLED | OUTPATIENT
Start: 2022-06-17 | End: 2022-06-17

## 2022-06-17 RX ORDER — LIDOCAINE HYDROCHLORIDE 20 MG/ML
JELLY TOPICAL ONCE
Status: CANCELLED | OUTPATIENT
Start: 2022-06-17 | End: 2022-06-17

## 2022-06-17 RX ORDER — SILVER SULFADIAZINE 10 G/1000G
CREAM TOPICAL ONCE
Status: CANCELLED | OUTPATIENT
Start: 2022-06-17 | End: 2022-06-17

## 2022-06-17 RX ORDER — BACITRACIN ZINC AND POLYMYXIN B SULFATE 500; 1000 [USP'U]/G; [USP'U]/G
OINTMENT TOPICAL ONCE
Status: CANCELLED | OUTPATIENT
Start: 2022-06-17 | End: 2022-06-17

## 2022-06-17 NOTE — WOUND CARE
06/17/22 0912   Wound Leg lower Left;Lateral;Superior   Date First Assessed/Time First Assessed: 03/18/22 1207   Present on Hospital Admission: Yes  Primary Wound Type: Traumatic  Location: Leg lower  Wound Location Orientation: Left;Lateral;Superior   Wound Image    Wound Etiology Traumatic   Dressing Status Intact   Cleansed Irrigated with saline   Dressing/Treatment Alginate;Dry dressing;Roll gauze;Zinc paste  (2 layer wrap; graft intact)   Dressing Change Due 06/24/22   Wound Length (cm) 2 cm   Wound Width (cm) 2.1 cm   Wound Depth (cm) 0.1 cm   Wound Surface Area (cm^2) 4.2 cm^2   Change in Wound Size % -16.67   Wound Volume (cm^3) 0.42 cm^3   Wound Healing % 42   Wound Assessment Graft;Granulation tissue   Drainage Amount Small   Drainage Description Serosanguinous   Wound Odor None   Britany-Wound/Incision Assessment Intact   Edges Defined edges   Wound Thickness Description Full thickness   Wound Leg lower Left;Lateral;Inferior   Date First Assessed/Time First Assessed: 02/25/22 1200   Present on Hospital Admission: Yes  Primary Wound Type: Traumatic  Location: Leg lower  Wound Location Orientation: Left;Lateral;Inferior   Wound Image   (see above)   Wound Etiology Traumatic   Dressing Status Intact   Cleansed Irrigated with saline; Wound cleanser   Dressing/Treatment Alginate;Dry dressing;Roll gauze;Zinc paste  (2 layer wrap; graft intact)   Dressing Change Due 06/24/22   Wound Length (cm) 5.8 cm   Wound Width (cm) 3.1 cm   Wound Depth (cm) 0.1 cm   Wound Surface Area (cm^2) 17.98 cm^2   Change in Wound Size % -817.35   Wound Volume (cm^3) 1.798 cm^3   Wound Healing % -359   Wound Assessment Graft;Pink/red   Drainage Amount Small   Drainage Description Serosanguinous   Wound Odor None   Britany-Wound/Incision Assessment Intact   Edges Defined edges   Wound Thickness Description Full thickness       Multilayer Compression Wrap   (Not Unna) Below the Knee    NAME:  Radha Mcdonald  DATE OF BIRTH: 1955  MEDICAL RECORD NUMBER:  [id number]  DATE:  6/17/2022    Removed old Multilayer wrap if indicated and wash leg with mild soap/water. Applied moisturizing agent to dry skin as needed. Applied primary and secondary dressing as ordered. Applied multilayered dressing below the knee to left lower leg. Instructed patient/caregiver not to remove dressing and to keep it clean and dry. Instructed patient/caregiver on complications to report to provider, such as pain, numbness in toes, heavy drainage, and slippage of dressing. Instructed patient on purpose of compression dressing and on activity and exercise recommendations.     Response to treatment: Well tolerated by patient       Electronically signed by Violet Fry RN on 6/17/2022 at 9:16 AM

## 2022-06-24 ENCOUNTER — HOSPITAL ENCOUNTER (OUTPATIENT)
Dept: WOUND CARE | Age: 67
Discharge: HOME | End: 2022-06-24
Attending: PODIATRIST
Payer: COMMERCIAL

## 2022-06-24 VITALS
SYSTOLIC BLOOD PRESSURE: 129 MMHG | DIASTOLIC BLOOD PRESSURE: 53 MMHG | TEMPERATURE: 98.5 F | RESPIRATION RATE: 16 BRPM | HEART RATE: 67 BPM | OXYGEN SATURATION: 99 %

## 2022-06-24 DIAGNOSIS — L97.922 ULCER OF LEFT LOWER LEG, WITH FAT LAYER EXPOSED (HCC): ICD-10-CM

## 2022-06-24 DIAGNOSIS — R60.0 EDEMA, LEG: ICD-10-CM

## 2022-06-24 DIAGNOSIS — I87.2 PERIPHERAL VENOUS INSUFFICIENCY: Primary | ICD-10-CM

## 2022-06-24 PROCEDURE — 29581 APPL MULTLAYER CMPRN SYS LEG: CPT

## 2022-06-24 RX ORDER — CLOBETASOL PROPIONATE 0.5 MG/G
OINTMENT TOPICAL ONCE
OUTPATIENT
Start: 2022-06-24 | End: 2022-06-24

## 2022-06-24 RX ORDER — BACITRACIN ZINC AND POLYMYXIN B SULFATE 500; 1000 [USP'U]/G; [USP'U]/G
OINTMENT TOPICAL ONCE
OUTPATIENT
Start: 2022-06-24 | End: 2022-06-24

## 2022-06-24 RX ORDER — SILVER SULFADIAZINE 10 G/1000G
CREAM TOPICAL ONCE
OUTPATIENT
Start: 2022-06-24 | End: 2022-06-24

## 2022-06-24 RX ORDER — MUPIROCIN 20 MG/G
OINTMENT TOPICAL ONCE
OUTPATIENT
Start: 2022-06-24 | End: 2022-06-24

## 2022-06-24 RX ORDER — LIDOCAINE HYDROCHLORIDE 20 MG/ML
JELLY TOPICAL ONCE
OUTPATIENT
Start: 2022-06-24 | End: 2022-06-24

## 2022-06-24 NOTE — WOUND CARE
Multilayer Compression Wrap   (Not Unna) Below the Knee    NAME:  Pravin Steven OF BIRTH:  1955  MEDICAL RECORD NUMBER:  [id number]  DATE:  6/24/2022    Removed old Multilayer wrap if indicated and wash leg with mild soap/water. Applied moisturizing agent to dry skin as needed. Applied primary and secondary dressing as ordered. Applied multilayered dressing below the knee to left lower leg. Instructed patient/caregiver not to remove dressing and to keep it clean and dry. Instructed patient/caregiver on complications to report to provider, such as pain, numbness in toes, heavy drainage, and slippage of dressing. Instructed patient on purpose of compression dressing and on activity and exercise recommendations.     Response to treatment: Well tolerated by patient       Electronically signed by Sarah Reeves LPN on 3/29/0122 at 20:21 PM

## 2022-06-24 NOTE — WOUND CARE
06/24/22 0832   Left Leg Edema Point of Measurement   Compression Therapy 2 layer compression wrap   Wound Leg lower Left;Lateral;Superior   Date First Assessed/Time First Assessed: 03/18/22 1207   Present on Hospital Admission: Yes  Primary Wound Type: Traumatic  Location: Leg lower  Wound Location Orientation: Left;Lateral;Superior   Wound Etiology Traumatic   Dressing Status Intact   Cleansed Irrigated with saline   Dressing/Treatment Alginate;Barrier film;Dry dressing;Zinc paste   Offloading for Diabetic Foot Ulcers   (2 layer compression wrap)   Dressing Change Due 07/08/22   Wound Length (cm) 2 cm   Wound Width (cm) 2.1 cm   Wound Depth (cm) 0.1 cm   Wound Surface Area (cm^2) 4.2 cm^2   Change in Wound Size % -16.67   Wound Volume (cm^3) 0.42 cm^3   Wound Healing % 42   Wound Assessment Graft;Granulation tissue   Drainage Amount Small   Drainage Description Serosanguinous   Wound Odor None   Britany-Wound/Incision Assessment Intact   Edges Defined edges   Wound Thickness Description Full thickness   Wound Leg lower Left;Lateral;Inferior   Date First Assessed/Time First Assessed: 02/25/22 1200   Present on Hospital Admission: Yes  Primary Wound Type: Traumatic  Location: Leg lower  Wound Location Orientation: Left;Lateral;Inferior   Wound Image    Wound Etiology Traumatic   Dressing Status Intact   Cleansed Irrigated with saline   Dressing/Treatment Alginate;Roll gauze;Zinc paste; Other (Comment)  (2 layer compression wrap)   Dressing Change Due 07/08/22   Wound Length (cm) 5.8 cm   Wound Width (cm) 3.1 cm   Wound Depth (cm) 0.1 cm   Wound Surface Area (cm^2) 17.98 cm^2   Change in Wound Size % -817.35   Wound Volume (cm^3) 1.798 cm^3   Wound Healing % -359   Wound Assessment Graft;Pink/red   Drainage Amount Small   Drainage Description Serosanguinous   Wound Odor None   Britany-Wound/Incision Assessment Intact   Edges Defined edges   Wound Thickness Description Full thickness   Pain 1   Pain Scale 1 Numeric (0 - 10)   Pain Intensity 1 4   Patient Stated Pain Goal 0

## 2022-06-24 NOTE — DISCHARGE INSTRUCTIONS
Discharge Instructions from  [facility]  [address], [address]  [phone] Fax [phone]    NAME:  Daniel Edward OF BIRTH:  1955  MEDICAL RECORD NUMBER:  [id number]  DATE:  6/24/2022    Wound Cleansing:   Do not scrub or use excessive force. Cleanse wound prior to applying a clean dressing with:  [x] Normal Saline [] Keep Wound Dry in Shower    [x] Wound Cleanser   [] Cleanse wound with Mild Soap & Water  [] May Shower at Discharge   [] Other:      Topical Treatments:  Do not apply lotions, creams, or ointments to wound bed unless directed. [x] Apply moisturizing lotion to skin surrounding the wound prior to dressing change.  [] Apply antifungal ointment to skin surrounding the wound prior to dressing change. [x] Apply thin film of moisture barrier ointment to skin immediately around wound. [] Other:       Dressings:           Wound Location ***   [x] Apply Primary Dressing:       [] MediHoney Gel [] Alginate with Silver [x] Alginate   [] Collagen [] Collagen with Silver   [] Santyl with Moisten saline gauze     [] Hydrocolloid   [] MediHoney Alginate [] Foam with Silver   [] Foam   [] Hydrofera Blue    [] Mepilex Border    [] Moisten with Saline [] Hydrogel [] Mepitel     [] Bactroban/Mupirocin [] Polysporin  [] Other:    [] Pack wound loosely with  [] Iodoform   [] Plain Packing  [] Other   [x] Cover and Secure with:     [x] Gauze [x] Jerrod [] Kerlix   [] Ace Wrap [] Cover Roll Tape [x] ABD     [] Other:    Avoid contact of tape with skin.   [x] Change dressing: [] Daily    [] Every Other Day [] Three times per week   [x] Once a week [] Do Not Change Dressing   [] Other:     Negative Pressure:           Wound Location:   [] Pressure@           mm/Hg  []Continuous []Intermittent   [] Black  [] White Foam [] Other:   []Change dressing: []Three times per week    []Other:     Pressure Relief:  [] When sitting, shift position or do seat lifts every 15 minutes.  [] Wheelchair cushion [] Specialty Bed/Mattress  [] Turn every 2 hours when in bed. Avoid position directing pressure on wound site. Limit side lying to 30 degree tilt. Limit HOB elevation to 30 degrees. Edema Control:  Apply: [] Compression Stocking []Right Leg []Left Leg   [] Tubigrip []Right Leg Double Layer []Left Leg Double Layer       []Right Leg Single Layer []Left Leg Single Layer   [] SpandaGrip []Right Leg  []Left Leg      []Low compression 5-10 mm/Hg      []Medium compression 10-20 mm/Hg     []High compression  20-30 mm/Hg   every morning immediately when getting up should be applied to affected leg(s) from mid foot to knee making sure to cover the heel. Remove every night before going to bed. [x] Elevate leg(s) above the level of the heart when sitting. [] Avoid prolonged standing in one place. Compression:  Apply: [x] Multilayer Compression Wrap Applied in Clinic []RightLeg [x]Left Leg   [] Multi-layer compression. Do not get leg(s) with wrap wet. If wraps become too tight call the center or completely remove the wrap. [] Elevate leg(s) above the level of the heart when sitting. [] Avoid prolonged standing in one place. Off-Loading:   [] Off-loading when [] walking  [] in bed [] sitting  [] Total non-weight bearing  [] Right Leg  [] Left Leg   [] Assistive Device [] Walker [] Cane  [] Wheelchair  [] Crutches   [] Surgical shoe    [] Podus Boot(s)   [] Foam Boot(s)  [] Roll About    [] Cast Boot [] CROW Boot  [] Other:    Contact Cast:  Apply: [] Total Contact Cast Applied in Clinic []RightLeg []Left Leg   [] Do not get cast wet. Contact center or go to emergency room if there is a foul odor or becomes uncomfortable due to feeling tight or swelling. Do not use objects inside of cast to scratch.       Dietary:  [x] Diet as tolerated: [] Calorie Diabetic Diet: [] No Added Salt:  [] Increase Protein: [] Other:   Activity:  [x] Activity as tolerated:  [x] Patient has no activity restrictions     [] Strict Bedrest: [] Remain off Work:     [] May return to full duty work:                                   [] Return to work with restrictions:             Return Appointment:  [x] Wound and dressing supply provider:   [] ECF or Home Healthcare:  [x] Wound Assessment: [x] Physician or NP scheduled for Wound Assessment:   [x] Return Appointment:1week(s)  [] Ordered tests:   Note: Patient informed me that he will be going on and extended vacation and will not be back for his next  treatments and dressing changes of at least 3 weeks, informed patient that he could buy ABD,s, Jerrod, and wound saline wash and change his dressing and he was okay with that. And apply Tubular stocking and Farrow wrap to left lower leg. Electronically signed Pau Gallardo LPN on 2/25/2066 at 87:80 AM     Shanta Thompson 281: Should you experience any significant changes in your wound(s) or have questions about your wound care, please contact the [address] [place] [address] at [address] [place] [address] 8:00 am - 4:30. If you need help with your wound outside these hours and cannot wait until we are again available, contact your PCP or go to the hospital emergency room. PLEASE NOTE: IF YOU ARE UNABLE TO OBTAIN WOUND SUPPLIES, CONTINUE TO USE THE SUPPLIES YOU HAVE AVAILABLE UNTIL YOU ARE ABLE TO REACH US. IT IS MOST IMPORTANT TO KEEP THE WOUND COVERED AT ALL TIMES.      Physician Signature:_______________________    Date: ___________ Time:  ____________

## 2022-07-29 ENCOUNTER — APPOINTMENT (OUTPATIENT)
Dept: WOUND CARE | Age: 67
End: 2022-07-29
Attending: PODIATRIST

## 2025-05-06 ENCOUNTER — TELEPHONE (OUTPATIENT)
Facility: HOSPITAL | Age: 70
End: 2025-05-06

## 2025-05-08 ENCOUNTER — HOSPITAL ENCOUNTER (OUTPATIENT)
Facility: HOSPITAL | Age: 70
Setting detail: RECURRING SERIES
Discharge: HOME | End: 2025-05-11
Payer: MEDICARE

## 2025-05-08 PROCEDURE — 97110 THERAPEUTIC EXERCISES: CPT

## 2025-05-08 PROCEDURE — 97162 PT EVAL MOD COMPLEX 30 MIN: CPT

## 2025-05-08 PROCEDURE — 97161 PT EVAL LOW COMPLEX 20 MIN: CPT

## 2025-05-08 PROCEDURE — 97535 SELF CARE MNGMENT TRAINING: CPT

## 2025-05-09 ENCOUNTER — TELEPHONE (OUTPATIENT)
Facility: HOSPITAL | Age: 70
End: 2025-05-09

## 2025-05-12 ENCOUNTER — APPOINTMENT (OUTPATIENT)
Facility: HOSPITAL | Age: 70
End: 2025-05-12
Payer: MEDICARE

## 2025-05-14 ENCOUNTER — APPOINTMENT (OUTPATIENT)
Facility: HOSPITAL | Age: 70
End: 2025-05-14
Payer: MEDICARE

## 2025-05-14 NOTE — PROGRESS NOTES
[facility]  [address] [address]  Phone: [phone]   Fax: [phone]    Discharge Summary    Patient name: Chandan Cross Start of Care: 2025   Referral source: Kelli Galarza PA : 1955               Medical Diagnosis: Pain in left knee  Pain in right knee  Muscle weakness (generalized)  Other abnormalities of gait and mobility Onset Date:~2003               Treatment Diagnosis:  M25.561  RIGHT KNEE PAIN and M25.562  LEFT KNEE PAIN                                           Prior Hospitalization: see medical history        Visits from Start of Care: 1    Missed Visits: 0    Reporting Period : 25 to 25    Goals/Measure of Progress:  N/a due to lack of follow up    Assessment/ Summary of Care: Unable to formally assess goals as pt failed to show for scheduled followup appts as he has discharged to follow up in [facility]. Please see below for goals assessment while pt was current under the care of this clinic. DC at this time with no further instructions provided to the patient. Thank you for this referral. Pt will require new order if he/she requires further rehab services.        RECOMMENDATIONS:  [x]Discontinue therapy: []Patient has reached or is progressing toward set goals      [x]Patient is non-compliant or has abdicated      []Due to lack of appreciable progress towards set goals    Jung Jett, PT 2025 9:15 AM    
[facility]  [address] [address]  Phone: [phone]   Fax: [phone]    Plan of Care/ Statement of Necessity for Physical Therapy Services        Patient name: Chandan Cross Start of Care: 2025   Referral source: Kelli Galarza PA : 1955    Medical Diagnosis: Pain in left knee  Pain in right knee  Muscle weakness (generalized)  Other abnormalities of gait and mobility Onset Date:~2003    Treatment Diagnosis:  M25.561  RIGHT KNEE PAIN and M25.562  LEFT KNEE PAIN                                           Prior Hospitalization: see medical history Provider#: [id number]     Comorbidities: Morbid obesity, Musculoskeletal disorders, and Other: depression (controlled with medication), diabetic, asthma, bipap machine for sleep apnea, kidney issues per pt, chronic wounds in both legs, lacking some of right achilles tendon due to prior infection and debridement, chronic edema in BLEs, B knee OA    Prior Level of Function: functionally independent, sedentary lifestyle, ambulating with cane intermittently for 20 years       The Plan of Care and following information is based on the information from the initial evaluation.  Assessment/ key information: Pt is a 69 yo male who presents to [facility] with c/o bialteral knee pain with history of bilateral LE chronic wounds, deficits secondary to prior right achilles loss secondary to infection, and chronic knee pain for years that has worsened impacting his ability to stand, negotiate stairs, Pt presents with sign and symptoms consistent with bilateral knee OA with associated deficits in bilateral knee ROM, bilateral ankle ROM more limited on right side, bilateral LE weakness ore notable on right side, altered gait mechanics, impaired ease of sit to stands, and limited ease of stair negotiation. Pt will benefit from skilled PT to address their impairments and facilitate improved functional status. 
    Sensation: WNL light touch    Deep Tendon Reflexes  Patellar: nt  Calcaneal: nt    Special Tests:    Palpation:   Location: TTP right > left knee joint line  Patient's Pain Response: [x] Min   [] Mod   [] Severe  Location:  Patient's Pain Response: [] Min   [] Mod   [] Severe    Other Tests / Comments: 30 sec STS: performed modified with use of BUE assist: 2.5 repetitions    TUG: TC    ASSESSMENT/Changes in Function: Pt is a 69 yo male who presents to [facility] with c/o bialteral knee pain with history of bilateral LE chronic wounds, deficits secondary to prior right achilles loss secondary to infection, and chronic knee pain for years that has worsened impacting his ability to stand, negotiate stairs, Pt presents with sign and symptoms consistent with bilateral knee OA with associated deficits in bilateral knee ROM, bilateral ankle ROM more limited on right side, bilateral LE weakness ore notable on right side, altered gait mechanics, impaired ease of sit to stands, and limited ease of stair negotiation. Pt will benefit from skilled PT to address their impairments and facilitate improved functional status.    Patient will continue to benefit from skilled PT / OT services to modify and progress therapeutic interventions, analyze and address functional mobility deficits, analyze and address ROM deficits, analyze and address strength deficits, analyze and address soft tissue restrictions, analyze and cue for proper movement patterns, analyze and address imbalance/dizziness, and instruct in home and community integration to address functional deficits and attain remaining goals.    Progress toward goals / Updated goals:  []  See Progress Note/Recertification  Short Term Goals: To be accomplished in 8 treatments   Patient will be independent and compliant with HEP to progress toward goals and restore functional mobility.   [facility] Status: issued at [facility]    Pt will demonstrate B knee PROM 0 - 105 degrees to aid in

## 2025-05-19 ENCOUNTER — APPOINTMENT (OUTPATIENT)
Facility: HOSPITAL | Age: 70
End: 2025-05-19
Payer: MEDICARE

## 2025-05-21 ENCOUNTER — APPOINTMENT (OUTPATIENT)
Facility: HOSPITAL | Age: 70
End: 2025-05-21
Payer: MEDICARE

## 2025-05-28 ENCOUNTER — APPOINTMENT (OUTPATIENT)
Facility: HOSPITAL | Age: 70
End: 2025-05-28
Payer: MEDICARE

## 2025-05-29 ENCOUNTER — APPOINTMENT (OUTPATIENT)
Facility: HOSPITAL | Age: 70
End: 2025-05-29
Payer: MEDICARE

## 2025-06-25 ENCOUNTER — TELEPHONE (OUTPATIENT)
Facility: HOSPITAL | Age: 70
End: 2025-06-25

## 2025-06-25 NOTE — TELEPHONE ENCOUNTER
Called MD office to check in on referral status as pt claims he asked MD to send another referral for OPPT after HH. Ashley explained pt asked for more HH as it was sent to another facility on 5/28 but will inform Kelli Galarza & contact pt to clarify what he wants to do.

## 2025-06-25 NOTE — TELEPHONE ENCOUNTER
Pt called to check in on referral status & informed his MD hasn't faxed new referral. Pt provided MD name/clinic # to contact & informed [facility] pt once referral is received.

## 2025-07-01 ENCOUNTER — TELEPHONE (OUTPATIENT)
Facility: HOSPITAL | Age: 70
End: 2025-07-01

## 2025-07-01 NOTE — TELEPHONE ENCOUNTER
RS 7/2 mary @ [facility] to 7/17 @ [facility] as pt wants to be seen @ closer facility.

## 2025-07-16 ENCOUNTER — TELEPHONE (OUTPATIENT)
Facility: HOSPITAL | Age: 70
End: 2025-07-16

## 2025-07-17 ENCOUNTER — APPOINTMENT (OUTPATIENT)
Facility: HOSPITAL | Age: 70
End: 2025-07-17
Payer: MEDICARE

## 2025-07-17 ENCOUNTER — HOSPITAL ENCOUNTER (OUTPATIENT)
Facility: HOSPITAL | Age: 70
Setting detail: RECURRING SERIES
Discharge: HOME | End: 2025-07-20
Payer: MEDICARE

## 2025-07-17 PROCEDURE — 97535 SELF CARE MNGMENT TRAINING: CPT

## 2025-07-17 PROCEDURE — 97110 THERAPEUTIC EXERCISES: CPT

## 2025-07-17 PROCEDURE — 97161 PT EVAL LOW COMPLEX 20 MIN: CPT

## 2025-07-17 NOTE — PROGRESS NOTES
[facility]  [address] [address]  Phone: [phone]   Fax: [phone]    Plan of Care/ Statement of Necessity for Physical Therapy Services        Patient name: Chandan Cross Start of Care: 2025   Referral source: Kelli Galarza PA : 1955    Medical Diagnosis: Pain in right knee  Pain in left knee  Muscle weakness (generalized)  Other abnormalities of gait and mobility Onset Date:~2023    Treatment Diagnosis:  M25.561  RIGHT KNEE PAIN and M25.562  LEFT KNEE PAIN  and R26.89  Abnormalities of gait and mobility and M62.81  GENERAL MUSCLE WEAKNESS                                          Prior Hospitalization: see medical history Provider#: [id number]     Comorbidities: Diabetes mellitus, Musculoskeletal disorders, Neurologic condition, Complications related to surgery, and Other: depression (controlled with medication), diabetic, asthma, bipap machine for sleep apnea, kidney issues per pt, chronic wounds in both legs, lacking some of right achilles tendon due to prior infection and debridement, chronic edema in BLEs, B knee OA     Prior Level of Function:  functionally independent, sedentary lifestyle, ambulating with cane intermittently for 2+ years, 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  Assessment/ key information: Pt is a 71 yo male who presents to [facility] with c/o bilateral knee pain with history of bilateral LE chronic wounds, deficits secondary to prior right achilles loss secondary to infection, and chronic knee pain for years that has worsened impacting his ability to stand, negotiate stairs. Pt presents with sign and symptoms consistent with bilateral knee OA with associated deficits in bilateral knee ROM, bilateral ankle ROM more limited on right side, bilateral LE weakness more notable on right side,

## 2025-07-17 NOTE — PROGRESS NOTES
PHYSICAL / OCCUPATIONAL THERAPY - Lower Extremity Eval and Daily note (updated )    Patient Name: Chandan Cross    Date: 2025    : 1955  Insurance: Payor: MEDICARE / Plan: MEDICARE PART A AND B / Product Type: *No Product type* /      Patient  verified yes     Visit #   Current / Total 1 24   Time   In / Out 10:46 am 11:29 am   Pain   In / Out 4 4   Subjective Functional Status/Changes: Pt is a 70 year old male presenting with c/o bilateral knee pain left > right and c/o difficulty walking, stair negotation, and transfers with onset in . Pt is a 70 year old male presenting with c/o wounds on his legs starting with onset  on his right ant tibia and moved into his right heel. States his calcaneal tendon was showing and had to have a graft of some sort. Found an infection while in for the graft and had sharp debridement and antibiotics with 12 day hospital stay. States it was about a year to recover. States he was in a boot for 4 months and then had a skin grafted but only 20% of it took. States he has been going weekly ever since to wound care. States his issue is that he now has trouble walking over the last many years as his left knee has progressively gotten worse and last few months his right knee started bothering him a lot as well. Notes going up and down stairs is difficult and his knees pop a lot. States walking is difficult and has to use a cane. States he is looking at weight loss surgery but has been losing some weight. Candidate for TKA in the future but wounds need to be closed and healed. Wounds have been having trouble healing for 2+ years.  Was here previously but then returned to home health. Notes he is going to [place] in August around the  for 2 weeks and will be out of town next week as well. Upcoming injections on .   Changes to:  Meds, Allergies, Med Hx, Sx Hx?  If yes, update Summary List no       “If an interpreting service was utilized for treatment of

## 2025-07-28 ENCOUNTER — HOSPITAL ENCOUNTER (OUTPATIENT)
Facility: HOSPITAL | Age: 70
Setting detail: RECURRING SERIES
Discharge: HOME | End: 2025-07-31
Payer: MEDICARE

## 2025-07-28 PROCEDURE — 97110 THERAPEUTIC EXERCISES: CPT

## 2025-07-28 PROCEDURE — 97112 NEUROMUSCULAR REEDUCATION: CPT

## 2025-07-28 PROCEDURE — 97530 THERAPEUTIC ACTIVITIES: CPT

## 2025-07-28 NOTE — PROGRESS NOTES
PHYSICAL / OCCUPATIONAL THERAPY - DAILY TREATMENT NOTE     Patient Name: Chandan Cross    Date: 2025    : 1955  Insurance: Payor: MEDICARE / Plan: MEDICARE PART A AND B / Product Type: *No Product type* /      Patient  verified Yes     Visit #   Current / Total 2 24   Time   In / Out 12:10 12:50   Pain   In / Out 4 2   Subjective Functional Status/Changes: Pt reports he's getting injections in both knees tomorrow     TREATMENT AREA =  Pain in right knee  Pain in left knee  Muscle weakness (generalized)  Other abnormalities of gait and mobility    If an interpreting service is utilized for treatment of this patient, the contents of this document represent the material reviewed with the patient via the .     OBJECTIVE    Therapeutic Procedures:  Tx Min Billable or 1:1 Min (if diff from Tx Min) Procedure, Rationale, Specifics   17  43018 Therapeutic Exercise (timed):  increase ROM, strength, coordination, balance, and proprioception to improve patient's ability to progress to PLOF and address remaining functional goals. (see flow sheet as applicable)    Details if applicable:       12  56964 Neuromuscular Re-Education (timed):  improve balance, coordination, kinesthetic sense, posture, core stability and proprioception to improve patient's ability to develop conscious control of individual muscles and awareness of position of extremities in order to progress to PLOF and address remaining functional goals. (see flow sheet as applicable)    Details if applicable:     11  80762 Therapeutic Activity (timed):  use of dynamic activities replicating functional movements to increase ROM, strength, coordination, balance, and proprioception in order to improve patient's ability to progress to PLOF and address remaining functional goals.  (see flow sheet as applicable)     Details if applicable:     40  [facility] Totals Reminder: bill using total billable min of TIMED therapeutic procedures (example: do

## 2025-08-01 ENCOUNTER — HOSPITAL ENCOUNTER (OUTPATIENT)
Facility: HOSPITAL | Age: 70
Setting detail: RECURRING SERIES
Discharge: HOME | End: 2025-08-04
Payer: MEDICARE

## 2025-08-01 PROCEDURE — 97110 THERAPEUTIC EXERCISES: CPT

## 2025-08-01 PROCEDURE — 97112 NEUROMUSCULAR REEDUCATION: CPT

## 2025-08-01 PROCEDURE — 97530 THERAPEUTIC ACTIVITIES: CPT

## 2025-08-05 ENCOUNTER — HOSPITAL ENCOUNTER (OUTPATIENT)
Facility: HOSPITAL | Age: 70
Setting detail: RECURRING SERIES
Discharge: HOME | End: 2025-08-08
Payer: MEDICARE

## 2025-08-05 PROCEDURE — 97530 THERAPEUTIC ACTIVITIES: CPT

## 2025-08-05 PROCEDURE — 97112 NEUROMUSCULAR REEDUCATION: CPT

## 2025-08-05 PROCEDURE — 97110 THERAPEUTIC EXERCISES: CPT

## 2025-08-07 ENCOUNTER — HOSPITAL ENCOUNTER (OUTPATIENT)
Facility: HOSPITAL | Age: 70
Setting detail: RECURRING SERIES
Discharge: HOME | End: 2025-08-10
Payer: MEDICARE

## 2025-08-07 PROCEDURE — 97530 THERAPEUTIC ACTIVITIES: CPT

## 2025-08-07 PROCEDURE — 97110 THERAPEUTIC EXERCISES: CPT

## 2025-08-07 PROCEDURE — 97112 NEUROMUSCULAR REEDUCATION: CPT

## 2025-08-11 ENCOUNTER — HOSPITAL ENCOUNTER (OUTPATIENT)
Facility: HOSPITAL | Age: 70
Setting detail: RECURRING SERIES
Discharge: HOME | End: 2025-08-14
Payer: MEDICARE

## 2025-08-11 PROCEDURE — 97112 NEUROMUSCULAR REEDUCATION: CPT

## 2025-08-11 PROCEDURE — 97110 THERAPEUTIC EXERCISES: CPT

## 2025-08-11 PROCEDURE — 97530 THERAPEUTIC ACTIVITIES: CPT

## 2025-08-25 ENCOUNTER — HOSPITAL ENCOUNTER (OUTPATIENT)
Facility: HOSPITAL | Age: 70
Setting detail: RECURRING SERIES
Discharge: HOME | End: 2025-08-28
Payer: MEDICARE

## 2025-08-25 PROCEDURE — 97530 THERAPEUTIC ACTIVITIES: CPT

## 2025-08-25 PROCEDURE — 97110 THERAPEUTIC EXERCISES: CPT

## 2025-08-25 PROCEDURE — 97112 NEUROMUSCULAR REEDUCATION: CPT

## 2025-08-28 ENCOUNTER — HOSPITAL ENCOUNTER (OUTPATIENT)
Facility: HOSPITAL | Age: 70
Setting detail: RECURRING SERIES
Discharge: HOME | End: 2025-08-31
Payer: MEDICARE

## 2025-08-28 PROCEDURE — 97530 THERAPEUTIC ACTIVITIES: CPT

## 2025-08-28 PROCEDURE — 97112 NEUROMUSCULAR REEDUCATION: CPT

## 2025-08-28 PROCEDURE — 97110 THERAPEUTIC EXERCISES: CPT

## 2025-09-03 ENCOUNTER — HOSPITAL ENCOUNTER (OUTPATIENT)
Facility: HOSPITAL | Age: 70
Setting detail: RECURRING SERIES
Discharge: HOME | End: 2025-09-06
Payer: MEDICARE

## 2025-09-03 PROCEDURE — 97112 NEUROMUSCULAR REEDUCATION: CPT

## 2025-09-03 PROCEDURE — 97110 THERAPEUTIC EXERCISES: CPT

## 2025-09-03 PROCEDURE — 97530 THERAPEUTIC ACTIVITIES: CPT

## 2025-09-04 ENCOUNTER — TELEPHONE (OUTPATIENT)
Facility: HOSPITAL | Age: 70
End: 2025-09-04

## (undated) DEVICE — WRISTBAND ID AD W2.5XL9.5CM RED VYN ADH CLSR UNI-PRINT

## (undated) DEVICE — NDL FLTR TIP 5 MIC 18GX1.5IN --

## (undated) DEVICE — KENDALL RADIOLUCENT FOAM MONITORING ELECTRODE RECTANGULAR SHAPE: Brand: KENDALL

## (undated) DEVICE — SPONGE GZ W4XL4IN RAYON POLY 4 PLY NONWOVEN FASTER WICKING

## (undated) DEVICE — SPONGE GZ W4XL4IN COT 12 PLY TYP VII WVN C FLD DSGN

## (undated) DEVICE — CATH SUC CTRL PRT TRIFLO 14FR --

## (undated) DEVICE — MAJ-1414 SINGLE USE ADPATER BIOPSY VALV: Brand: SINGLE USE ADAPTOR BIOPSY VALVE

## (undated) DEVICE — CANNULA CUSH AD W/ 14FT TBG

## (undated) DEVICE — CATH IV SAFE STR 22GX1IN BLU -- PROTECTIV PLUS

## (undated) DEVICE — TRAP SPEC COLL POLYP POLYSTYR --

## (undated) DEVICE — SHIELD RAD 14X16 IN W/ SCOOP ABSORBER

## (undated) DEVICE — SYRINGE 50ML E/T

## (undated) DEVICE — MEDI-VAC NON-CONDUCTIVE SUCTION TUBING: Brand: CARDINAL HEALTH

## (undated) DEVICE — CATH DIAG FR4 EXPO 5FRX100CM -- EXPO

## (undated) DEVICE — SET ADMIN 16ML TBNG L100IN 2 Y INJ SITE IV PIGGY BK DISP

## (undated) DEVICE — GLIDESHEATH SLENDER STAINLESS STEEL KIT: Brand: GLIDESHEATH SLENDER

## (undated) DEVICE — NDL PRT INJ NSAF BLNT 18GX1.5 --

## (undated) DEVICE — TUBING PRSS MON L24IN PVC RIG NONEXPANDING M TO FEM CONN

## (undated) DEVICE — GUIDEWIRE VASC L260CM DIA0.035IN RAD 3MM J TIP L7CM PTFE

## (undated) DEVICE — Device

## (undated) DEVICE — DRAPE,ANGIO,BRACH,STERILE,38X44: Brand: MEDLINE

## (undated) DEVICE — PRESSURE MONITORING SET: Brand: TRUWAVE

## (undated) DEVICE — PACK PROCEDURE SURG CATH CUST

## (undated) DEVICE — TORCON NB, ADVANTAGE CATHETER: Brand: TORCON NB

## (undated) DEVICE — SOLUTION IV 500ML 0.9% SOD CHL FLX CONT

## (undated) DEVICE — SNARE POLYP SM W13MMXL240CM SHTH DIA2.4MM OVL FLX DISP

## (undated) DEVICE — SINGLE PORT MANIFOLD: Brand: NEPTUNE 2

## (undated) DEVICE — STOPCOCK TRNSDUC 500PSI 3 W ROT M LUER LT BLU OFF HNDL R

## (undated) DEVICE — TRNQT TEXT 1X18IN BLU LF DISP -- CONVERT TO ITEM 362165

## (undated) DEVICE — SENSOR PLSE OXMTR AD CBL L36IN ADH FRM FIT SPO2 DISP

## (undated) DEVICE — ENDO CARRY-ON PROCEDURE KIT INCLUDES ENZYMATIC SPONGE, GAUZE, BIOHAZARD LABEL, TRAY, LUBRICANT, DIRTY SCOPE LABEL, WATER LABEL, TRAY, DRAWSTRING PAD, AND DEFENDO 4-PIECE KIT.: Brand: ENDO CARRY-ON PROCEDURE KIT

## (undated) DEVICE — SYR 3ML LL TIP 1/10ML GRAD --

## (undated) DEVICE — PROCEDURE KIT FLUID MGMT 10 FR CUST MAINFOLD

## (undated) DEVICE — BAND RADIAL COMPR ARTERY 24CM -- REG BX/10

## (undated) DEVICE — SYR 5ML 1/5 GRAD LL NSAF LF --